# Patient Record
Sex: MALE | Race: WHITE | NOT HISPANIC OR LATINO | Employment: OTHER | URBAN - METROPOLITAN AREA
[De-identification: names, ages, dates, MRNs, and addresses within clinical notes are randomized per-mention and may not be internally consistent; named-entity substitution may affect disease eponyms.]

---

## 2019-06-15 ENCOUNTER — HOSPITAL ENCOUNTER (EMERGENCY)
Facility: CLINIC | Age: 74
Discharge: HOME | End: 2019-06-15
Attending: EMERGENCY MEDICINE | Admitting: EMERGENCY MEDICINE
Payer: COMMERCIAL

## 2019-06-15 VITALS
DIASTOLIC BLOOD PRESSURE: 67 MMHG | OXYGEN SATURATION: 98 % | TEMPERATURE: 97.9 F | WEIGHT: 188 LBS | RESPIRATION RATE: 12 BRPM | HEART RATE: 53 BPM | SYSTOLIC BLOOD PRESSURE: 123 MMHG | HEIGHT: 72 IN | BODY MASS INDEX: 25.47 KG/M2

## 2019-06-15 DIAGNOSIS — R55 NEAR SYNCOPE: ICD-10-CM

## 2019-06-15 PROBLEM — Z96.651 HISTORY OF TOTAL KNEE ARTHROPLASTY, RIGHT: Status: ACTIVE | Noted: 2019-05-06

## 2019-06-15 PROBLEM — M25.562 CHRONIC PAIN OF BOTH KNEES: Status: ACTIVE | Noted: 2017-07-20

## 2019-06-15 PROBLEM — G31.01 PRIMARY PROGRESSIVE APHASIA (H): Status: ACTIVE | Noted: 2019-02-26

## 2019-06-15 PROBLEM — G89.29 CHRONIC PAIN OF BOTH KNEES: Status: ACTIVE | Noted: 2017-07-20

## 2019-06-15 PROBLEM — M25.561 CHRONIC PAIN OF BOTH KNEES: Status: ACTIVE | Noted: 2017-07-20

## 2019-06-15 PROBLEM — I63.9 CEREBRAL INFARCT (H): Status: ACTIVE | Noted: 2018-07-30

## 2019-06-15 PROBLEM — F02.80 PRIMARY PROGRESSIVE APHASIA (H): Status: ACTIVE | Noted: 2019-02-26

## 2019-06-15 PROBLEM — M17.9 ARTHRITIS OF KNEE, DEGENERATIVE: Status: ACTIVE | Noted: 2019-03-04

## 2019-06-15 LAB
ALBUMIN UR-MCNC: 10 MG/DL
ANION GAP SERPL CALCULATED.3IONS-SCNC: 6 MMOL/L (ref 3–14)
APPEARANCE UR: CLEAR
BASOPHILS # BLD AUTO: 0.1 10E9/L (ref 0–0.2)
BASOPHILS NFR BLD AUTO: 1 %
BILIRUB UR QL STRIP: NEGATIVE
BUN SERPL-MCNC: 17 MG/DL (ref 7–30)
CALCIUM SERPL-MCNC: 8.5 MG/DL (ref 8.5–10.1)
CHLORIDE SERPL-SCNC: 110 MMOL/L (ref 94–109)
CO2 SERPL-SCNC: 26 MMOL/L (ref 20–32)
COLOR UR AUTO: YELLOW
CREAT SERPL-MCNC: 0.85 MG/DL (ref 0.66–1.25)
DIFFERENTIAL METHOD BLD: ABNORMAL
EOSINOPHIL # BLD AUTO: 0.4 10E9/L (ref 0–0.7)
EOSINOPHIL NFR BLD AUTO: 5.7 %
ERYTHROCYTE [DISTWIDTH] IN BLOOD BY AUTOMATED COUNT: 13.5 % (ref 10–15)
GFR SERPL CREATININE-BSD FRML MDRD: 86 ML/MIN/{1.73_M2}
GLUCOSE SERPL-MCNC: 104 MG/DL (ref 70–99)
GLUCOSE UR STRIP-MCNC: NEGATIVE MG/DL
HCT VFR BLD AUTO: 34.6 % (ref 40–53)
HGB BLD-MCNC: 11.6 G/DL (ref 13.3–17.7)
HGB UR QL STRIP: NEGATIVE
IMM GRANULOCYTES # BLD: 0 10E9/L (ref 0–0.4)
IMM GRANULOCYTES NFR BLD: 0.2 %
INTERPRETATION ECG - MUSE: NORMAL
KETONES UR STRIP-MCNC: 5 MG/DL
LEUKOCYTE ESTERASE UR QL STRIP: ABNORMAL
LYMPHOCYTES # BLD AUTO: 1.6 10E9/L (ref 0.8–5.3)
LYMPHOCYTES NFR BLD AUTO: 26.6 %
MCH RBC QN AUTO: 29.5 PG (ref 26.5–33)
MCHC RBC AUTO-ENTMCNC: 33.5 G/DL (ref 31.5–36.5)
MCV RBC AUTO: 88 FL (ref 78–100)
MONOCYTES # BLD AUTO: 0.5 10E9/L (ref 0–1.3)
MONOCYTES NFR BLD AUTO: 8.2 %
MUCOUS THREADS #/AREA URNS LPF: PRESENT /LPF
NEUTROPHILS # BLD AUTO: 3.6 10E9/L (ref 1.6–8.3)
NEUTROPHILS NFR BLD AUTO: 58.3 %
NITRATE UR QL: NEGATIVE
NRBC # BLD AUTO: 0 10*3/UL
NRBC BLD AUTO-RTO: 0 /100
PH UR STRIP: 5.5 PH (ref 5–7)
PLATELET # BLD AUTO: 197 10E9/L (ref 150–450)
POTASSIUM SERPL-SCNC: 3.5 MMOL/L (ref 3.4–5.3)
RBC # BLD AUTO: 3.93 10E12/L (ref 4.4–5.9)
RBC #/AREA URNS AUTO: <1 /HPF (ref 0–2)
SODIUM SERPL-SCNC: 142 MMOL/L (ref 133–144)
SOURCE: ABNORMAL
SP GR UR STRIP: 1.03 (ref 1–1.03)
TROPONIN I SERPL-MCNC: <0.015 UG/L (ref 0–0.04)
TSH SERPL DL<=0.005 MIU/L-ACNC: 1.24 MU/L (ref 0.4–4)
UROBILINOGEN UR STRIP-MCNC: 2 MG/DL (ref 0–2)
WBC # BLD AUTO: 6.1 10E9/L (ref 4–11)
WBC #/AREA URNS AUTO: 1 /HPF (ref 0–5)

## 2019-06-15 PROCEDURE — 81001 URINALYSIS AUTO W/SCOPE: CPT | Performed by: EMERGENCY MEDICINE

## 2019-06-15 PROCEDURE — 96361 HYDRATE IV INFUSION ADD-ON: CPT

## 2019-06-15 PROCEDURE — 25000128 H RX IP 250 OP 636: Performed by: EMERGENCY MEDICINE

## 2019-06-15 PROCEDURE — 84443 ASSAY THYROID STIM HORMONE: CPT | Performed by: EMERGENCY MEDICINE

## 2019-06-15 PROCEDURE — 99284 EMERGENCY DEPT VISIT MOD MDM: CPT | Mod: 25

## 2019-06-15 PROCEDURE — 80048 BASIC METABOLIC PNL TOTAL CA: CPT | Performed by: EMERGENCY MEDICINE

## 2019-06-15 PROCEDURE — 96360 HYDRATION IV INFUSION INIT: CPT

## 2019-06-15 PROCEDURE — 84484 ASSAY OF TROPONIN QUANT: CPT | Performed by: EMERGENCY MEDICINE

## 2019-06-15 PROCEDURE — 93005 ELECTROCARDIOGRAM TRACING: CPT

## 2019-06-15 PROCEDURE — 85025 COMPLETE CBC W/AUTO DIFF WBC: CPT | Performed by: EMERGENCY MEDICINE

## 2019-06-15 RX ORDER — ATORVASTATIN CALCIUM 10 MG/1
10 TABLET, FILM COATED ORAL
COMMUNITY
Start: 2019-04-16

## 2019-06-15 RX ORDER — DONEPEZIL HYDROCHLORIDE 5 MG/1
5 TABLET, FILM COATED ORAL
COMMUNITY

## 2019-06-15 RX ORDER — TAMSULOSIN HYDROCHLORIDE 0.4 MG/1
0.4 CAPSULE ORAL
COMMUNITY
Start: 2019-04-16

## 2019-06-15 RX ORDER — SODIUM CHLORIDE 9 MG/ML
1000 INJECTION, SOLUTION INTRAVENOUS CONTINUOUS
Status: DISCONTINUED | OUTPATIENT
Start: 2019-06-15 | End: 2019-06-15 | Stop reason: HOSPADM

## 2019-06-15 RX ORDER — ASPIRIN 81 MG/1
81 TABLET ORAL
COMMUNITY
Start: 2019-04-26

## 2019-06-15 RX ORDER — FLUTICASONE PROPIONATE 50 MCG
1 SPRAY, SUSPENSION (ML) NASAL
COMMUNITY

## 2019-06-15 RX ORDER — DIPHENOXYLATE HYDROCHLORIDE AND ATROPINE SULFATE 2.5; .025 MG/1; MG/1
1 TABLET ORAL
COMMUNITY

## 2019-06-15 RX ORDER — SODIUM PHOSPHATE,MONO-DIBASIC 19G-7G/118
2 ENEMA (ML) RECTAL
COMMUNITY

## 2019-06-15 RX ORDER — ASCORBIC ACID 500 MG
500 TABLET ORAL
COMMUNITY

## 2019-06-15 RX ADMIN — SODIUM CHLORIDE 1000 ML: 9 INJECTION, SOLUTION INTRAVENOUS at 11:45

## 2019-06-15 ASSESSMENT — ENCOUNTER SYMPTOMS
VOMITING: 0
DIAPHORESIS: 1
SHORTNESS OF BREATH: 0
DIARRHEA: 1
LIGHT-HEADEDNESS: 1
HEMATURIA: 0
PALPITATIONS: 0
NAUSEA: 0
HEADACHES: 0
COLOR CHANGE: 0
DYSURIA: 0
ABDOMINAL PAIN: 0

## 2019-06-15 ASSESSMENT — MIFFLIN-ST. JEOR: SCORE: 1630.76

## 2019-06-15 NOTE — ED AVS SNAPSHOT
Emergency Department  [address] [place] [address]  Phone:  [phone]  Fax:  [phone]                                    Burt Lopez   MRN: [id number]    Department:   Emergency Department   Date of Visit:  6/15/2019           After Visit Summary Signature Page    I have received my discharge instructions, and my questions have been answered. I have discussed any challenges I see with this plan with the nurse or doctor.    ..........................................................................................................................................  Patient/Patient Representative Signature      ..........................................................................................................................................  Patient Representative Print Name and Relationship to Patient    ..................................................               ................................................  Date                                   Time    ..........................................................................................................................................  Reviewed by Signature/Title    ...................................................              ..............................................  Date                                               Time          22EPIC Rev 08/18

## 2019-06-15 NOTE — ED PROVIDER NOTES
History     Chief Complaint:  Syncope (pt arrives via ambulance from [facility]. was shopping with his daughter when he started to get really sweaty and became unresponsive. Pt did not fall or lose complete consciousness, but per daughter his eyes rolled back in his head. Pt then came to and stated he needed to use the bathroom, was able to ambulate on his own and had a BM. VSS for EMS, . Pt has no complaints at this time just states he feels like he could take a nap. )    HPI   Burt Lopez is a 74 year old male with a history of stroke and hypertension who presents with near-syncope and lightheadedness. The patient's daughter reports that they were at [facility] and the patient was using the [employer] card machine when she noticed that he started to get shaky.  The pt remembers thiis and reports he was feeling lightheaded at that time.  He reports he has had a similar episode in the past. His daughter reported that he started to get unsteady, so she grabbed him and brought him over to a chair where he was diaphoretic and unresponsive. She notes that his eyes never closed, but the patient was unresponsive. The patient's daughter reports that eventually, he was responsive. Here in the ED, the patient reports that he remembered the event and felt light headed. He notes that he usually feels light headed after he takes imodium for his diarrhea. The patient reports that he had diarrhea yesterday, took two imodium, and had a soft bowel movement today. The patient denies recent illness exposure, change in urination, shortness of breath, chest pain, leg swelling, palpitations, abdominal pain, nausea, vomiting, headaches, rash or color change.    Allergies:  Penicillins    Medications:    Albuterol  Lipitor  Flomax    Past Medical History:    Asthma  Arthritis of knee  Benign prostatic hyperplasia  Cerebral infarct  Abdominal aortic aneurysm  Stroke    Past Surgical History:    Hemorrhoid surgery  Total knee arthroplasty  (R)    Family History:    Alcoholism  Pancreatic cancer metastatic to liver  Allergies  Asthma  Heart disease  Lung cancer  Throat cancer    Social History:  Smoking status: Former, 27 years since quitting  Alcohol use: No  Drug use: No  PCP: Adan Reyes  Presents to the ED with daughters  Marital Status:   [2]    Review of Systems   Constitutional: Positive for diaphoresis.   Respiratory: Negative for shortness of breath.    Cardiovascular: Negative for chest pain, palpitations and leg swelling.   Gastrointestinal: Positive for diarrhea. Negative for abdominal pain, nausea and vomiting.   Genitourinary: Negative for decreased urine volume, dysuria and hematuria.   Skin: Negative for color change and rash.   Neurological: Positive for syncope (near-syncope) and light-headedness. Negative for headaches.   All other systems reviewed and are negative.      Physical Exam     Patient Vitals for the past 24 hrs:   BP Temp Temp src Pulse Heart Rate Resp SpO2 Height Weight   06/15/19 1300 123/67 -- -- 53 54 12 -- -- --   06/15/19 1126 145/71 97.9  F (36.6  C) Oral -- 53 14 98 % 1.829 m (6') 85.3 kg (188 lb)     Physical Exam  Constitutional: Well developed, nontox appearance  Head: Atraumatic.   Mouth/Throat: Oropharynx is clear and moist.   Neck:  no stridor  Eyes: no scleral icterus, PERRL, EOMI  Cardiovascular: RRR, 2+ bilat radial pulses  Pulmonary/Chest: nml resp effort, Clear BS bilat  Abdominal: ND, +BS, soft, NT, no rebound or guarding   Ext: Warm, well perfused, no edema  Neurological: A&O,  CNII-XII intact, nml finger to nose, 5/5 strength throughout upper and lower ext, symmetric; sensation grossly intact  Skin: Skin is warm and dry.   Psychiatric: Behavior is normal. Thought content normal.   Nursing note and vitals reviewed.    Emergency Department Course   ECG (11:38:45):  Rate 61 bpm. LA interval 170. QRS duration 118. QT/QTc 474/477. P-R-T axes 68 31 55. Normal sinus rhythm. Nonspecific  intraventricular conduction delay. Borderline ECG. Interpreted at 1143 by Hector Lopez MD.    Laboratory:  CBC: HGB 11.6 (L) o/w WNL (WBC 6.1, )  BMP: Chloride 110 (H), Glucose 104 (H) o/w WNL (Creatinine 0.85)  Troponin I (1133): < 0.015  TSH with free T4 reflex: 1.24    Interventions:  1145: NS 1L IV Bolus    Emergency Department Course:  The patient arrived in the emergency department via EMS.  Past medical records, nursing notes, and vitals reviewed.  1128: I performed an exam of the patient and obtained history, as documented above.  IV inserted and blood drawn.  EKG performed, results above.     1209: I rechecked the patient.    1303: I rechecked the patient. Explained findings to the patient and his family. The patient ambulated to the bathroom.    1403: I rechecked the patient. Findings and plan explained to the Patient and daughters. Patient discharged home with instructions regarding supportive care, medications, and reasons to return. The importance of close follow-up was reviewed.     Impression & Plan    Medical Decision Makin year old male presenting w/ lightheadedness, near syncope     DDx includes orthostatic near syncope, vasovagal near syncope, electrolyte abnormality, dehydration, hypoglycemia.  Doubt ischemic CVA, intracranial hemorrhage given symptomatology, physical exam.  EKG interpretation as noted above significant for sinus bradycardia.  Previous heart rates noted on clinic notes range from the 60s to 80s.  Labs and imaging ordered as noted above.  Labs significant for minimal anemia but improved from last hemoglobin level in late 2019, UA inconsistent with infection, creatinine within normal limits, TSH within normal limits.  A long discussion with the patient's family regarding CT imaging.  I do not feel CT imaging is warranted at this time given the patient has no history of head trauma, is not anticoagulated nor do his symptoms seem consistent with  intracranial hemorrhage.  Offered CT bead despite likely not indicated.  After discussion, family is agreeable to not pursue head CT at this time.  Fluids given in the emergency department the patient reports feeling back to baseline.  Doubt significant arrhythmia given EKG, prodromal symptoms.  At this time I feel the pt is safe for discharge.  Recommendations given regarding follow up with PCP and return to the emergency department as needed for new or worsening symptoms.  Counseled on all results, disposition and diagnosis.  Patient and family understanding and agreeable to plan. Patient discharged in stable condition.     Diagnosis:    ICD-10-CM   1. Near syncope R55     Disposition:  discharged to home    Fermin Sheikh  6/15/2019    EMERGENCY DEPARTMENT  IFermin am serving as a scribe at 11:28 AM on 6/15/2019 to document services personally performed by Hector Lopez MD based on my observations and the provider's statements to me.      Hector Lopez MD  06/16/19 0014

## 2019-06-15 NOTE — DISCHARGE INSTRUCTIONS
Please eat when you get home.    Please drink plenty of fluids for the rest of the day and rest.    Please follow-up with your primary care doctor this coming week for recheck.    Please return to the emergency department as needed for new or worsening symptoms including fainting, severe confusion, focal weakness, chest pain, shortness of breath, vomiting and unable to keep anything down, any other concerning symptoms.

## 2019-06-15 NOTE — ED NOTES
Bed: ED27  Expected date:   Expected time:   Means of arrival:   Comments:  amita  - Lyle M syncope eta 1119

## 2019-11-08 ENCOUNTER — AMBULATORY - HEALTHEAST (OUTPATIENT)
Dept: GERIATRICS | Facility: CLINIC | Age: 74
End: 2019-11-08

## 2019-11-11 ENCOUNTER — AMBULATORY - HEALTHEAST (OUTPATIENT)
Dept: ADMINISTRATIVE | Facility: CLINIC | Age: 74
End: 2019-11-11

## 2019-11-11 ENCOUNTER — RECORDS - HEALTHEAST (OUTPATIENT)
Dept: LAB | Facility: CLINIC | Age: 74
End: 2019-11-11

## 2019-11-11 ENCOUNTER — OFFICE VISIT - HEALTHEAST (OUTPATIENT)
Dept: GERIATRICS | Facility: CLINIC | Age: 74
End: 2019-11-11

## 2019-11-11 DIAGNOSIS — M17.12 PRIMARY OSTEOARTHRITIS OF LEFT KNEE: ICD-10-CM

## 2019-11-11 DIAGNOSIS — Z71.89 ACP (ADVANCE CARE PLANNING): ICD-10-CM

## 2019-11-11 DIAGNOSIS — K59.03 DRUG-INDUCED CONSTIPATION: ICD-10-CM

## 2019-11-12 ENCOUNTER — OFFICE VISIT - HEALTHEAST (OUTPATIENT)
Dept: GERIATRICS | Facility: CLINIC | Age: 74
End: 2019-11-12

## 2019-11-12 DIAGNOSIS — R47.01 APHASIA: ICD-10-CM

## 2019-11-12 DIAGNOSIS — R52 PAIN MANAGEMENT: ICD-10-CM

## 2019-11-12 DIAGNOSIS — M17.12 PRIMARY OSTEOARTHRITIS OF LEFT KNEE: ICD-10-CM

## 2019-11-12 DIAGNOSIS — K59.03 DRUG-INDUCED CONSTIPATION: ICD-10-CM

## 2019-11-12 DIAGNOSIS — E78.2 MIXED HYPERLIPIDEMIA: ICD-10-CM

## 2019-11-12 DIAGNOSIS — R41.89 COGNITIVE IMPAIRMENT: ICD-10-CM

## 2019-11-12 LAB
ANION GAP SERPL CALCULATED.3IONS-SCNC: 11 MMOL/L (ref 5–18)
BUN SERPL-MCNC: 17 MG/DL (ref 8–28)
CALCIUM SERPL-MCNC: 9.4 MG/DL (ref 8.5–10.5)
CHLORIDE BLD-SCNC: 104 MMOL/L (ref 98–107)
CO2 SERPL-SCNC: 23 MMOL/L (ref 22–31)
CREAT SERPL-MCNC: 1 MG/DL (ref 0.7–1.3)
ERYTHROCYTE [DISTWIDTH] IN BLOOD BY AUTOMATED COUNT: 13.3 % (ref 11–14.5)
GFR SERPL CREATININE-BSD FRML MDRD: >60 ML/MIN/1.73M2
GLUCOSE BLD-MCNC: 110 MG/DL (ref 70–125)
HCT VFR BLD AUTO: 37.6 % (ref 40–54)
HGB BLD-MCNC: 12 G/DL (ref 14–18)
MAGNESIUM SERPL-MCNC: 2.3 MG/DL (ref 1.8–2.6)
MCH RBC QN AUTO: 29.2 PG (ref 27–34)
MCHC RBC AUTO-ENTMCNC: 31.9 G/DL (ref 32–36)
MCV RBC AUTO: 92 FL (ref 80–100)
PLATELET # BLD AUTO: 288 THOU/UL (ref 140–440)
PMV BLD AUTO: 9.6 FL (ref 8.5–12.5)
POTASSIUM BLD-SCNC: 4.2 MMOL/L (ref 3.5–5)
RBC # BLD AUTO: 4.11 MILL/UL (ref 4.4–6.2)
SODIUM SERPL-SCNC: 138 MMOL/L (ref 136–145)
WBC: 9.5 THOU/UL (ref 4–11)

## 2019-11-13 ENCOUNTER — OFFICE VISIT - HEALTHEAST (OUTPATIENT)
Dept: GERIATRICS | Facility: CLINIC | Age: 74
End: 2019-11-13

## 2019-11-13 DIAGNOSIS — K59.03 DRUG-INDUCED CONSTIPATION: ICD-10-CM

## 2019-11-13 DIAGNOSIS — M17.12 PRIMARY OSTEOARTHRITIS OF LEFT KNEE: ICD-10-CM

## 2019-11-14 ENCOUNTER — OFFICE VISIT - HEALTHEAST (OUTPATIENT)
Dept: GERIATRICS | Facility: CLINIC | Age: 74
End: 2019-11-14

## 2019-11-14 DIAGNOSIS — G31.01 PRIMARY PROGRESSIVE APHASIA (H): ICD-10-CM

## 2019-11-14 DIAGNOSIS — F02.80 PRIMARY PROGRESSIVE APHASIA (H): ICD-10-CM

## 2019-11-14 DIAGNOSIS — M17.12 PRIMARY OSTEOARTHRITIS OF LEFT KNEE: ICD-10-CM

## 2019-11-14 DIAGNOSIS — I63.529: ICD-10-CM

## 2019-11-18 ENCOUNTER — AMBULATORY - HEALTHEAST (OUTPATIENT)
Dept: GERIATRICS | Facility: CLINIC | Age: 74
End: 2019-11-18

## 2021-06-03 VITALS
DIASTOLIC BLOOD PRESSURE: 72 MMHG | BODY MASS INDEX: 27.75 KG/M2 | SYSTOLIC BLOOD PRESSURE: 135 MMHG | WEIGHT: 204.6 LBS | HEART RATE: 69 BPM | TEMPERATURE: 97.2 F

## 2021-06-03 VITALS
HEART RATE: 78 BPM | BODY MASS INDEX: 27.75 KG/M2 | DIASTOLIC BLOOD PRESSURE: 61 MMHG | TEMPERATURE: 97.4 F | SYSTOLIC BLOOD PRESSURE: 144 MMHG | WEIGHT: 204.6 LBS

## 2021-06-03 VITALS
BODY MASS INDEX: 27.75 KG/M2 | WEIGHT: 204.6 LBS | DIASTOLIC BLOOD PRESSURE: 67 MMHG | SYSTOLIC BLOOD PRESSURE: 168 MMHG | TEMPERATURE: 98.7 F | HEART RATE: 87 BPM

## 2021-06-03 NOTE — PROGRESS NOTES
[facility]      NAME:  Burt Lopez             :  1945    MRN: [id number]    CODE STATUS:  FULL CODE    FACILITY: [facility] [[id number]]  CHIEF COMPLAIN/REASON FOR VISIT:  Chief Complaint   Patient presents with     Review Of Multiple Medical Conditions       HISTORY OF PRESENT ILLNESS: Burt Lopez is a 74 y.o. male being seen today for review of multiple medical conditions. . He has a with a history of left knee osteoarthritis, abdominal aortic aneurysm, BPH, asthma and primary progressive aphasia, who underwent ARTHROPLASTY LEFT KNEE on 2019  His knee is dressed, , dressing C/D/I, to remain on until first ortho visit. Reports his pain is stable, after therapy today more pain is felt. Knee with faded bruising noted and swelling. He has been using ice reports effective with pain management. Reports progression in rehab and hopes to dec by end of week.       Allergies   Allergen Reactions     Penicillins Rash     Rash.     :     Current Outpatient Medications   Medication Sig     acetaminophen (TYLENOL) 500 MG tablet Take 1,000 mg by mouth 4 (four) times a day.     ascorbic acid, vitamin C, (VITAMIN C) 500 MG tablet Take 500 mg by mouth daily.     aspirin 81 MG EC tablet Take 81 mg by mouth 2 (two) times a day.     atorvastatin (LIPITOR) 10 MG tablet Take 10 mg by mouth every evening.     cholecalciferol, vitamin D3, 1,000 unit (25 mcg) tablet Take 2,000 Units by mouth daily.     donepezil (ARICEPT) 5 MG tablet Take 5 mg by mouth daily.     fluticasone propionate (FLONASE) 50 mcg/actuation nasal spray Apply 1 spray into each nostril daily as needed for rhinitis.     hydrOXYzine pamoate (VISTARIL) 25 MG capsule Take 25 mg by mouth at bedtime.     ketotifen (ZADITOR/ZYRTEC ITCHY EYES) 0.025 % (0.035 %) ophthalmic solution Administer 1 drop to both eyes daily as needed.     melatonin 5 mg Tab tablet Take 5 mg by mouth at bedtime.     multivitamin  (MULTIPLE VITAMIN ORAL) Take 1 tablet by mouth daily.     oxyCODONE (ROXICODONE) 5 MG immediate release tablet Take 5-10 mg by mouth every 4 (four) hours as needed for pain.     polyethylene glycol (MIRALAX) 17 gram packet Take 17 g by mouth 2 (two) times a day.     senna-docusate (SENNOSIDES-DOCUSATE SODIUM) 8.6-50 mg tablet Take 1 tablet by mouth daily.     tamsulosin (FLOMAX) 0.4 mg cap Take 0.4 mg by mouth daily after supper.         REVIEW OF SYSTEMS:    Currently, no fever, chills, or rigors. Does not have any visual or hearing problems. Denies any chest pain, headaches, palpitations, lightheadedness, dizziness, shortness of breath, or cough. Appetite is good. Denies any GERD symptoms. Denies any difficulty with swallowing, nausea, or vomiting.  Denies any abdominal pain,   Constipation imporved. Denies any urinary symptoms. No insomnia. No active bleeding. No rash.       PHYSICAL EXAMINATION:  Vitals:    11/14/19 0512   BP: 144/61   Pulse: 78   Temp: 97.4  F (36.3  C)   Weight: 204 lb 9.6 oz (92.8 kg)         GENERAL: Awake, Alert, oriented x3, not in any form of acute distress, answers questions appropriately, follows simple commands, conversant  HEENT: Head is normocephalic with normal hair distribution. No evidence of trauma. Ears: No acute purulent discharge. Eyes: Conjunctivae pink with no scleral jaundice. Nose: Normal mucosa and septum. NECK: Supple with no cervical or supraclavicular lymphadenopathy. Trachea is midline.   CHEST: No tenderness or deformity, no crepitus  LUNG: Clear to auscultation with good chest expansion. There are no crackles or wheezes, normal AP diameter.  BACK: No kyphosis of the thoracic spine. Symmetric, no curvature, ROM normal, no CVA tenderness, no spinal tenderness   CVS: There is good S1  S2, rhythm is regular.  ABDOMEN: Globular and soft, nontender to palpation, non distended, no masses, no organomegaly, good bowel sounds, no rebound or guarding, no peritoneal signs.    EXTREMITIES: Atraumatic. Limited  range of motion to left knee,  extremities, there is no tenderness to palpation, no pedal edema  SKIN: Warm and dry, no erythema noted, no rashes or lesions.  NEUROLOGICAL: The patient is oriented to person, place and time. Strength and sensation are grossly intact. Face is symmetric.                    LABS:    Lab Results   Component Value Date    WBC 9.5 11/12/2019    HGB 12.0 (L) 11/12/2019    HCT 37.6 (L) 11/12/2019    MCV 92 11/12/2019     11/12/2019       Results for orders placed or performed in visit on 11/12/19   Basic Metabolic Panel   Result Value Ref Range    Sodium 138 136 - 145 mmol/L    Potassium 4.2 3.5 - 5.0 mmol/L    Chloride 104 98 - 107 mmol/L    CO2 23 22 - 31 mmol/L    Anion Gap, Calculation 11 5 - 18 mmol/L    Glucose 110 70 - 125 mg/dL    Calcium 9.4 8.5 - 10.5 mg/dL    BUN 17 8 - 28 mg/dL    Creatinine 1.00 0.70 - 1.30 mg/dL    GFR MDRD Af Amer >60 >60 mL/min/1.73m2    GFR MDRD Non Af Amer >60 >60 mL/min/1.73m2           No results found for: HGBA1C  No results found for: EQWDCYGT09CO  No results found for: HHGHOPJS08    ASSESSMENT/PLAN:  1. Primary osteoarthritis of left knee    2. Drug-induced constipation      1.LTKA: Leave current dressing in place, SN to assist with pain management and chronic medical conditions. To attend therapies for rehab for knee. Reports pain present, ice has been an effective adjunct to oral meds. Ongoing therapies with potential dc this week end.    2.Costipaton, Started on on Miralax daily, add senna s 1 po daily for bowel program , reports BM more regular now.        Electronically signed by:  Apolonia Rodriguez CNP  This progress note was completed using Dragon software and there may be grammatical errors.

## 2021-06-03 NOTE — PROGRESS NOTES
[facility] Admission note      Patient: Burt Lopez  MRN: [id number]  Date of Service: 11/12/2019      [facility] [[id number]]  Reason for Visit     Chief Complaint   Patient presents with     H & P       Code Status     FULL CODE    Assessment     -Status post left TKA done as an elective procedure on 11/5/2019  -Postoperative pain management  - ABLA  -  DVT prophylaxis  -History of idiopathic expressive aphasia  -History of CVA  -History of suspected dementia  - HLD ON statin    Plan     Patient is admitted to the TCU.  Incision was examined and healing well with an intact Mepilex dressing  His range of movement is quite limited to about 30 degrees and he was educated not to take the Mepilex dressing off  He will follow-up with his surgeon  Continue with his PT OT and rehab he is currently emulating well using a walker.  Discharge planning was reviewed with him and he is hoping to discharge home this week he does live alone and told me as soon as he can get off oxycodone and he can go home he can resume driving also.  Family history some concerns about cognition  Staff is also noted some concerns evaluate a formal cognitive testing he does have expressive aphasia but was able to communicate his needs reasonably well to me.  Check labs reviewed and hemoglobin is now 12 which is a significant improvement  Electrolytes and kidney functions are also stable  Senna as added to his bowel regimen for constipation concerns  Continue with his PT OT and rehab    History     Patient is a very pleasant 74 y.o. male who is admitted to TCU  Patient was electively admitted and underwent a left total knee arthroplasty this was done as an elective procedure on 11/5/2019 he tolerated well.  Currently is been discharged weightbearing as tolerated  He has a Mepilex dressing which is intact over his left knee he believes that his range of movement is quite limited due to the dressing and was educated  that his surgeon has requested that dressing not be taken off.  He has underlying history of dementia.  He has appeared to be alert and oriented but as per his daughter and staff there is some episodes of confusion.  There is also significant expressive aphasia noted which is idiopathic as per him he is able to communicate reasonably well and wants to discharge home soon.  Pain management reviewed he is on Tylenol Vistaril as well as oxycodone which has been effective he is also been icing his knee.  Postoperatively he had some drop in hemoglobin his discharge hemoglobin was stable at 10.6 with no need for blood transfusion  Recheck hemoglobin done in the TCU was 12 showing significant improvement      Past Medical History     Active Ambulatory (Non-Hospital) Problems    Diagnosis     Constipation     ACP (advance care planning)     Arthritis of knee, degenerative     Past Medical History:   Diagnosis Date     AAA (abdominal aortic aneurysm) (H)      Arthritis of knee, degenerative      Asthma      BPH (benign prostatic hyperplasia)      HLD (hyperlipidemia)      Primary localized osteoarthritis of left knee      Primary progressive aphasia (H)      Seasonal allergic rhinitis due to pollen        Past Social History     Reviewed, and he  reports that he quit smoking about 30 years ago. He smoked 0.00 packs per day for 27.00 years. He has never used smokeless tobacco. He reports previous alcohol use.    Family History     Reviewed, and family history includes Alcoholism in his father; Heart attack in his father; Lung cancer in his father; Pancreatic cancer in his brother; Throat cancer in his mother.    Medication List   Post Discharge Medication Reconciliation Status: discharge medications reconciled and changed, per note/orders (see AVS)   Current Outpatient Medications on File Prior to Visit   Medication Sig Dispense Refill     acetaminophen (TYLENOL) 500 MG tablet Take 1,000 mg by mouth 4 (four) times a day.        ascorbic acid, vitamin C, (VITAMIN C) 500 MG tablet Take 500 mg by mouth daily.       aspirin 81 MG EC tablet Take 81 mg by mouth 2 (two) times a day.       atorvastatin (LIPITOR) 10 MG tablet Take 10 mg by mouth every evening.       cholecalciferol, vitamin D3, 1,000 unit (25 mcg) tablet Take 2,000 Units by mouth daily.       donepezil (ARICEPT) 5 MG tablet Take 5 mg by mouth daily.       fluticasone propionate (FLONASE) 50 mcg/actuation nasal spray Apply 1 spray into each nostril daily as needed for rhinitis.       hydrOXYzine pamoate (VISTARIL) 25 MG capsule Take 25 mg by mouth at bedtime.       ketotifen (ZADITOR/ZYRTEC ITCHY EYES) 0.025 % (0.035 %) ophthalmic solution Administer 1 drop to both eyes daily as needed.       melatonin 5 mg Tab tablet Take 5 mg by mouth at bedtime.       multivitamin (MULTIPLE VITAMIN ORAL) Take 1 tablet by mouth daily.       oxyCODONE (ROXICODONE) 5 MG immediate release tablet Take 5-10 mg by mouth every 4 (four) hours as needed for pain.       polyethylene glycol (MIRALAX) 17 gram packet Take 17 g by mouth 2 (two) times a day.       senna-docusate (SENNOSIDES-DOCUSATE SODIUM) 8.6-50 mg tablet Take 1 tablet by mouth daily.       tamsulosin (FLOMAX) 0.4 mg cap Take 0.4 mg by mouth daily after supper.       Current Facility-Administered Medications on File Prior to Visit   Medication Dose Route Frequency Provider Last Rate Last Dose     [DISCONTINUED] acetaminophen tablet (TYLENOL)  1,000 mg Oral  GENERIC EXTERNAL DATA PROVIDER         [DISCONTINUED] aspirin EC tablet  81 mg Oral  GENERIC EXTERNAL DATA PROVIDER         [DISCONTINUED] atorvastatin tablet (LIPITOR)  10 mg Oral  GENERIC EXTERNAL DATA PROVIDER         [DISCONTINUED] calcium (as carbonate) chewable tablet (TUMS)  500-1,000 mg Oral  GENERIC EXTERNAL DATA PROVIDER         [DISCONTINUED] diazePAM tablet (VALIUM)  2 mg Oral  GENERIC EXTERNAL DATA PROVIDER         [DISCONTINUED] donepezil tablet (ARICEPT)  5 mg Oral   GENERIC EXTERNAL DATA PROVIDER         [DISCONTINUED] GENERIC EXTERNAL MEDICATION  4 mg Sublingual  GENERIC EXTERNAL DATA PROVIDER         [DISCONTINUED] HYDROmorphone injection (DILAUDID)  0.2-0.4 mg Intravenous  GENERIC EXTERNAL DATA PROVIDER         [DISCONTINUED] melatonin tablet  5 mg Oral  GENERIC EXTERNAL DATA PROVIDER         [DISCONTINUED] naloxone injection (NARCAN)  0.4 mg Intravenous  GENERIC EXTERNAL DATA PROVIDER         [DISCONTINUED] oxyCODONE immediate release tablet (ROXICODONE)  5-10 mg Oral  GENERIC EXTERNAL DATA PROVIDER         [DISCONTINUED] oxyCODONE immediate release tablet (ROXICODONE)  5 mg Oral  GENERIC EXTERNAL DATA PROVIDER         [DISCONTINUED] polyethylene glycol packet (MIRALAX)  1 packet Oral  GENERIC EXTERNAL DATA PROVIDER         [DISCONTINUED] senna-docusate 8.6-50 mg tablet (PERICOLACE)  1-3 tablet Oral  GENERIC EXTERNAL DATA PROVIDER         [DISCONTINUED] sodium chloride 0.9 % with KCl 20 mEq/L infusion  125 mL/hr Intravenous  GENERIC EXTERNAL DATA PROVIDER         [DISCONTINUED] sodium chloride flush (NS)  5 mL Intravenous  GENERIC EXTERNAL DATA PROVIDER         [DISCONTINUED] sodium chloride flush (NS)  5 mL Intravenous  GENERIC EXTERNAL DATA PROVIDER         [DISCONTINUED] sodium chloride flush (NS)  10 mL Intravenous  GENERIC EXTERNAL DATA PROVIDER         [DISCONTINUED] tamsulosin 24 hr capsule (FLOMAX)  0.4 mg Oral  GENERIC EXTERNAL DATA PROVIDER           Allergies     Allergies   Allergen Reactions     Penicillins Rash     Rash.         Review of Systems   A comprehensive review of 14 systems was done. Pertinent findings noted here and in history of present illness. All the rest negative.  Constitutional: Negative.  Negative for fever, chills, he has activity change, appetite change and fatigue.   HENT: Negative for congestion and facial swelling.    Eyes: Negative for photophobia, redness and visual disturbance.   Respiratory: Negative for cough and chest tightness.     Cardiovascular: Negative for chest pain, palpitations and leg swelling.   Gastrointestinal: Negative for nausea, diarrhea, constipation, blood in stool and abdominal distention.   Genitourinary: Negative.    Musculoskeletal: Negative.  Reporting pain with swelling in the left knee which is limiting his ability to bend the knee  Skin: Negative.    Neurological: Negative for dizziness, tremors, syncope, weakness, light-headedness and headaches.   Has chronic expressive aphasia but can communicate  Hematological: Does not bruise/bleed easily.   Psychiatric/Behavioral: Negative.        Physical Exam     Recent Vitals 11/12/2019   Weight 204 lbs 10 oz   /72   Pulse 69   Temp 97.2       Constitutional: Oriented to person, place, and time and appears well-developed.   HEENT:  Normocephalic and atraumatic.  Eyes: Conjunctivae and EOM are normal. Pupils are equal, round, and reactive to light. No discharge.  No scleral icterus. Nose normal. Mouth/Throat: Oropharynx is clear and moist. No oropharyngeal exudate.    NECK: Normal range of motion. Neck supple. No JVD present. No tracheal deviation present. No thyromegaly present.   CARDIOVASCULAR: Normal rate, regular rhythm and intact distal pulses.  Exam reveals no gallop and no friction rub.  Systolic murmur present.  PULMONARY: Effort normal and breath sounds normal. No respiratory distress.No Wheezing or rales.  ABDOMEN: Soft. Bowel sounds are normal. No distension and no mass.  There is no tenderness. There is no rebound and no guarding. No HSM.  MUSCULOSKELETAL: Normal range of motion. No edema and no tenderness. Mild kyphosis, no tenderness.  Left knee joint with an intact surgical incision covered with a Mepilex dressing  Some effusion noted around the knee joint  LYMPH NODES: Has no cervical, supraclavicular, axillary and groin adenopathy.   NEUROLOGICAL: Alert and oriented to person, place, and time. No cranial nerve deficit.  Normal muscle tone. Coordination  normal.   Patient has expressive aphasia and had some word finding difficulty but able to communicate well  GENITOURINARY: Deferred exam.  SKIN: Skin is warm and dry. No rash noted. No erythema. No pallor.   EXTREMITIES: No cyanosis, no clubbing, no edema. No Deformity.  PSYCHIATRIC: Normal mood, affect and behavior.  Family and staff noticing some confusion      Lab Results     Recent Results (from the past 240 hour(s))   HM2(CBC w/o Differential)   Result Value Ref Range    WBC 9.5 4.0 - 11.0 thou/uL    RBC 4.11 (L) 4.40 - 6.20 mill/uL    Hemoglobin 12.0 (L) 14.0 - 18.0 g/dL    Hematocrit 37.6 (L) 40.0 - 54.0 %    MCV 92 80 - 100 fL    MCH 29.2 27.0 - 34.0 pg    MCHC 31.9 (L) 32.0 - 36.0 g/dL    RDW 13.3 11.0 - 14.5 %    Platelets 288 140 - 440 thou/uL    MPV 9.6 8.5 - 12.5 fL   Basic Metabolic Panel   Result Value Ref Range    Sodium 138 136 - 145 mmol/L    Potassium 4.2 3.5 - 5.0 mmol/L    Chloride 104 98 - 107 mmol/L    CO2 23 22 - 31 mmol/L    Anion Gap, Calculation 11 5 - 18 mmol/L    Glucose 110 70 - 125 mg/dL    Calcium 9.4 8.5 - 10.5 mg/dL    BUN 17 8 - 28 mg/dL    Creatinine 1.00 0.70 - 1.30 mg/dL    GFR MDRD Af Amer >60 >60 mL/min/1.73m2    GFR MDRD Non Af Amer >60 >60 mL/min/1.73m2   Magnesium   Result Value Ref Range    Magnesium 2.3 1.8 - 2.6 mg/dL           JEANNE June

## 2021-06-03 NOTE — PROGRESS NOTES
[facility]      NAME:  Burt Lopez             :  1945    MRN: [id number]    CODE STATUS:  FULL CODE    FACILITY: [facility] (formerly [facility]) [[id number]]  CHIEF COMPLAIN/REASON FOR VISIT:  Chief Complaint   Patient presents with     Review Of Multiple Medical Conditions       HISTORY OF PRESENT ILLNESS: Burt Lopez is a 74 y.o. male being seen today as a new admit from [facility]. He has a with a history of left knee osteoarthritis, abdominal aortic aneurysm, BPH, asthma and primary progressive aphasia, who underwent ARTHROPLASTY LEFT KNEE on 2019  His knee is dressed, , dressing C/D/I, o remain on until first ortho vidit. Reports his pain is stable. Knee with faded bruising noted and swelling. He has been using ice reports effective with pain management. Relatively benign assessment other then no BM x5 days.      Allergies   Allergen Reactions     Penicillins Rash     Rash.     :     Current Outpatient Medications   Medication Sig     acetaminophen (TYLENOL) 500 MG tablet Take 1,000 mg by mouth 4 (four) times a day.     ascorbic acid, vitamin C, (VITAMIN C) 500 MG tablet Take 500 mg by mouth daily.     aspirin 81 MG EC tablet Take 81 mg by mouth 2 (two) times a day.     atorvastatin (LIPITOR) 10 MG tablet Take 10 mg by mouth every evening.     cholecalciferol, vitamin D3, 1,000 unit (25 mcg) tablet Take 2,000 Units by mouth daily.     donepezil (ARICEPT) 5 MG tablet Take 5 mg by mouth daily.     fluticasone propionate (FLONASE) 50 mcg/actuation nasal spray Apply 1 spray into each nostril daily as needed for rhinitis.     hydrOXYzine pamoate (VISTARIL) 25 MG capsule Take 25 mg by mouth at bedtime.     ketotifen (ZADITOR/ZYRTEC ITCHY EYES) 0.025 % (0.035 %) ophthalmic solution Administer 1 drop to both eyes daily as needed.     melatonin 5 mg Tab tablet Take 5 mg by mouth at bedtime.     multivitamin (MULTIPLE VITAMIN ORAL) Take 1 tablet by mouth daily.      oxyCODONE (ROXICODONE) 5 MG immediate release tablet Take 5-10 mg by mouth every 4 (four) hours as needed for pain.     polyethylene glycol (MIRALAX) 17 gram packet Take 17 g by mouth 2 (two) times a day.     senna-docusate (SENNOSIDES-DOCUSATE SODIUM) 8.6-50 mg tablet Take 1 tablet by mouth daily.     tamsulosin (FLOMAX) 0.4 mg cap Take 0.4 mg by mouth daily after supper.         REVIEW OF SYSTEMS:    Currently, no fever, chills, or rigors. Does not have any visual or hearing problems. Denies any chest pain, headaches, palpitations, lightheadedness, dizziness, shortness of breath, or cough. Appetite is good. Denies any GERD symptoms. Denies any difficulty with swallowing, nausea, or vomiting.  Denies any abdominal pain, has  constipation. Denies any urinary symptoms. No insomnia. No active bleeding. No rash.       PHYSICAL EXAMINATION:  Vitals:    11/12/19 0554   BP: 135/72   Pulse: 69   Temp: 97.2  F (36.2  C)   Weight: 204 lb 9.6 oz (92.8 kg)         GENERAL: Awake, Alert, oriented x3, not in any form of acute distress, answers questions appropriately, follows simple commands, conversant  HEENT: Head is normocephalic with normal hair distribution. No evidence of trauma. Ears: No acute purulent discharge. Eyes: Conjunctivae pink with no scleral jaundice. Nose: Normal mucosa and septum. NECK: Supple with no cervical or supraclavicular lymphadenopathy. Trachea is midline.   CHEST: No tenderness or deformity, no crepitus  LUNG: Clear to auscultation with good chest expansion. There are no crackles or wheezes, normal AP diameter.  BACK: No kyphosis of the thoracic spine. Symmetric, no curvature, ROM normal, no CVA tenderness, no spinal tenderness   CVS: There is good S1  S2, rhythm is regular.  ABDOMEN: Globular and soft, nontender to palpation, non distended, no masses, no organomegaly, good bowel sounds, no rebound or guarding, no peritoneal signs.   EXTREMITIES: Atraumatic. Limited  range of motion to left knee,   extremities, there is no tenderness to palpation, no pedal edema  SKIN: Warm and dry, no erythema noted, no rashes or lesions.  NEUROLOGICAL: The patient is oriented to person, place and time. Strength and sensation are grossly intact. Face is symmetric.                    LABS:    No results found for: WBC, HGB, HCT, MCV, PLT    No results found for this or any previous visit.        No results found for: HGBA1C  No results found for: [id number]  No results found for: [id number]    ASSESSMENT/PLAN:  1. Primary osteoarthritis of left knee    2. Drug-induced constipation    3. ACP (advance care planning)      1.LTKA: Leave current dressing in place, SN to assist with pain management and chronic medical conditions. To attend therapies for rehab for knee.    2.Costipaton, no BM x 5 days, give MOM 30 cc po today, he is on Miralax daily, add senna s 1 po daily for bowel program     3. ACP: Pt full code status, greater then 16 minutes spent face to face with pt discussing his advance care planning.       Electronically signed by:  Apolonia Rodriguez CNP  This progress note was completed using Dragon software and there may be grammatical errors.

## 2021-06-19 NOTE — LETTER
Letter by Yadira Stacy MBBS at      Author: Yadira Stacy MBBS Service: -- Author Type: --    Filed:  Encounter Date: 11/12/2019 Status: Signed         Patient: Burt Lopez   MR Number: [id number]   YOB: 1945   Date of Visit: 11/12/2019       [facility] Admission note      Patient: Burt Lopez  MRN: [id number]  Date of Service: 11/12/2019      [facility] [[id number]]  Reason for Visit     Chief Complaint   Patient presents with   ? H & P       Code Status     FULL CODE    Assessment     -Status post left TKA done as an elective procedure on 11/5/2019  -Postoperative pain management  - ABLA  -  DVT prophylaxis  -History of idiopathic expressive aphasia  -History of CVA  -History of suspected dementia  - HLD ON statin    Plan     Patient is admitted to the TCU.  Incision was examined and healing well with an intact Mepilex dressing  His range of movement is quite limited to about 30 degrees and he was educated not to take the Mepilex dressing off  He will follow-up with his surgeon  Continue with his PT OT and rehab he is currently emulating well using a walker.  Discharge planning was reviewed with him and he is hoping to discharge home this week he does live alone and told me as soon as he can get off oxycodone and he can go home he can resume driving also.  Family history some concerns about cognition  Staff is also noted some concerns evaluate a formal cognitive testing he does have expressive aphasia but was able to communicate his needs reasonably well to me.  Check labs reviewed and hemoglobin is now 12 which is a significant improvement  Electrolytes and kidney functions are also stable  Senna as added to his bowel regimen for constipation concerns  Continue with his PT OT and rehab    History     Patient is a very pleasant 74 y.o. male who is admitted to TCU  Patient was electively admitted and underwent a left total knee arthroplasty this was done as  an elective procedure on 11/5/2019 he tolerated well.  Currently is been discharged weightbearing as tolerated  He has a Mepilex dressing which is intact over his left knee he believes that his range of movement is quite limited due to the dressing and was educated that his surgeon has requested that dressing not be taken off.  He has underlying history of dementia.  He has appeared to be alert and oriented but as per his daughter and staff there is some episodes of confusion.  There is also significant expressive aphasia noted which is idiopathic as per him he is able to communicate reasonably well and wants to discharge home soon.  Pain management reviewed he is on Tylenol Vistaril as well as oxycodone which has been effective he is also been icing his knee.  Postoperatively he had some drop in hemoglobin his discharge hemoglobin was stable at 10.6 with no need for blood transfusion  Recheck hemoglobin done in the TCU was 12 showing significant improvement      Past Medical History     Active Ambulatory (Non-Hospital) Problems    Diagnosis   ? Constipation   ? ACP (advance care planning)   ? Arthritis of knee, degenerative     Past Medical History:   Diagnosis Date   ? AAA (abdominal aortic aneurysm) (H)    ? Arthritis of knee, degenerative    ? Asthma    ? BPH (benign prostatic hyperplasia)    ? HLD (hyperlipidemia)    ? Primary localized osteoarthritis of left knee    ? Primary progressive aphasia (H)    ? Seasonal allergic rhinitis due to pollen        Past Social History     Reviewed, and he  reports that he quit smoking about 30 years ago. He smoked 0.00 packs per day for 27.00 years. He has never used smokeless tobacco. He reports previous alcohol use.    Family History     Reviewed, and family history includes Alcoholism in his father; Heart attack in his father; Lung cancer in his father; Pancreatic cancer in his brother; Throat cancer in his mother.    Medication List   Post Discharge Medication  Reconciliation Status: discharge medications reconciled and changed, per note/orders (see AVS)   Current Outpatient Medications on File Prior to Visit   Medication Sig Dispense Refill   ? acetaminophen (TYLENOL) 500 MG tablet Take 1,000 mg by mouth 4 (four) times a day.     ? ascorbic acid, vitamin C, (VITAMIN C) 500 MG tablet Take 500 mg by mouth daily.     ? aspirin 81 MG EC tablet Take 81 mg by mouth 2 (two) times a day.     ? atorvastatin (LIPITOR) 10 MG tablet Take 10 mg by mouth every evening.     ? cholecalciferol, vitamin D3, 1,000 unit (25 mcg) tablet Take 2,000 Units by mouth daily.     ? donepezil (ARICEPT) 5 MG tablet Take 5 mg by mouth daily.     ? fluticasone propionate (FLONASE) 50 mcg/actuation nasal spray Apply 1 spray into each nostril daily as needed for rhinitis.     ? hydrOXYzine pamoate (VISTARIL) 25 MG capsule Take 25 mg by mouth at bedtime.     ? ketotifen (ZADITOR/ZYRTEC ITCHY EYES) 0.025 % (0.035 %) ophthalmic solution Administer 1 drop to both eyes daily as needed.     ? melatonin 5 mg Tab tablet Take 5 mg by mouth at bedtime.     ? multivitamin (MULTIPLE VITAMIN ORAL) Take 1 tablet by mouth daily.     ? oxyCODONE (ROXICODONE) 5 MG immediate release tablet Take 5-10 mg by mouth every 4 (four) hours as needed for pain.     ? polyethylene glycol (MIRALAX) 17 gram packet Take 17 g by mouth 2 (two) times a day.     ? senna-docusate (SENNOSIDES-DOCUSATE SODIUM) 8.6-50 mg tablet Take 1 tablet by mouth daily.     ? tamsulosin (FLOMAX) 0.4 mg cap Take 0.4 mg by mouth daily after supper.       Current Facility-Administered Medications on File Prior to Visit   Medication Dose Route Frequency Provider Last Rate Last Dose   ? [DISCONTINUED] acetaminophen tablet (TYLENOL)  1,000 mg Oral  GENERIC EXTERNAL DATA PROVIDER       ? [DISCONTINUED] aspirin EC tablet  81 mg Oral  GENERIC EXTERNAL DATA PROVIDER       ? [DISCONTINUED] atorvastatin tablet (LIPITOR)  10 mg Oral  GENERIC EXTERNAL DATA PROVIDER        ? [DISCONTINUED] calcium (as carbonate) chewable tablet (TUMS)  500-1,000 mg Oral  GENERIC EXTERNAL DATA PROVIDER       ? [DISCONTINUED] diazePAM tablet (VALIUM)  2 mg Oral  GENERIC EXTERNAL DATA PROVIDER       ? [DISCONTINUED] donepezil tablet (ARICEPT)  5 mg Oral  GENERIC EXTERNAL DATA PROVIDER       ? [DISCONTINUED] GENERIC EXTERNAL MEDICATION  4 mg Sublingual  GENERIC EXTERNAL DATA PROVIDER       ? [DISCONTINUED] HYDROmorphone injection (DILAUDID)  0.2-0.4 mg Intravenous  GENERIC EXTERNAL DATA PROVIDER       ? [DISCONTINUED] melatonin tablet  5 mg Oral  GENERIC EXTERNAL DATA PROVIDER       ? [DISCONTINUED] naloxone injection (NARCAN)  0.4 mg Intravenous  GENERIC EXTERNAL DATA PROVIDER       ? [DISCONTINUED] oxyCODONE immediate release tablet (ROXICODONE)  5-10 mg Oral  GENERIC EXTERNAL DATA PROVIDER       ? [DISCONTINUED] oxyCODONE immediate release tablet (ROXICODONE)  5 mg Oral  GENERIC EXTERNAL DATA PROVIDER       ? [DISCONTINUED] polyethylene glycol packet (MIRALAX)  1 packet Oral  GENERIC EXTERNAL DATA PROVIDER       ? [DISCONTINUED] senna-docusate 8.6-50 mg tablet (PERICOLACE)  1-3 tablet Oral  GENERIC EXTERNAL DATA PROVIDER       ? [DISCONTINUED] sodium chloride 0.9 % with KCl 20 mEq/L infusion  125 mL/hr Intravenous  GENERIC EXTERNAL DATA PROVIDER       ? [DISCONTINUED] sodium chloride flush (NS)  5 mL Intravenous  GENERIC EXTERNAL DATA PROVIDER       ? [DISCONTINUED] sodium chloride flush (NS)  5 mL Intravenous  GENERIC EXTERNAL DATA PROVIDER       ? [DISCONTINUED] sodium chloride flush (NS)  10 mL Intravenous  GENERIC EXTERNAL DATA PROVIDER       ? [DISCONTINUED] tamsulosin 24 hr capsule (FLOMAX)  0.4 mg Oral  GENERIC EXTERNAL DATA PROVIDER           Allergies     Allergies   Allergen Reactions   ? Penicillins Rash     Rash.         Review of Systems   A comprehensive review of 14 systems was done. Pertinent findings noted here and in history of present illness. All the rest  negative.  Constitutional: Negative.  Negative for fever, chills, he has activity change, appetite change and fatigue.   HENT: Negative for congestion and facial swelling.    Eyes: Negative for photophobia, redness and visual disturbance.   Respiratory: Negative for cough and chest tightness.    Cardiovascular: Negative for chest pain, palpitations and leg swelling.   Gastrointestinal: Negative for nausea, diarrhea, constipation, blood in stool and abdominal distention.   Genitourinary: Negative.    Musculoskeletal: Negative.  Reporting pain with swelling in the left knee which is limiting his ability to bend the knee  Skin: Negative.    Neurological: Negative for dizziness, tremors, syncope, weakness, light-headedness and headaches.   Has chronic expressive aphasia but can communicate  Hematological: Does not bruise/bleed easily.   Psychiatric/Behavioral: Negative.        Physical Exam     Recent Vitals 11/12/2019   Weight 204 lbs 10 oz   /72   Pulse 69   Temp 97.2       Constitutional: Oriented to person, place, and time and appears well-developed.   HEENT:  Normocephalic and atraumatic.  Eyes: Conjunctivae and EOM are normal. Pupils are equal, round, and reactive to light. No discharge.  No scleral icterus. Nose normal. Mouth/Throat: Oropharynx is clear and moist. No oropharyngeal exudate.    NECK: Normal range of motion. Neck supple. No JVD present. No tracheal deviation present. No thyromegaly present.   CARDIOVASCULAR: Normal rate, regular rhythm and intact distal pulses.  Exam reveals no gallop and no friction rub.  Systolic murmur present.  PULMONARY: Effort normal and breath sounds normal. No respiratory distress.No Wheezing or rales.  ABDOMEN: Soft. Bowel sounds are normal. No distension and no mass.  There is no tenderness. There is no rebound and no guarding. No HSM.  MUSCULOSKELETAL: Normal range of motion. No edema and no tenderness. Mild kyphosis, no tenderness.  Left knee joint with an intact  surgical incision covered with a Mepilex dressing  Some effusion noted around the knee joint  LYMPH NODES: Has no cervical, supraclavicular, axillary and groin adenopathy.   NEUROLOGICAL: Alert and oriented to person, place, and time. No cranial nerve deficit.  Normal muscle tone. Coordination normal.   Patient has expressive aphasia and had some word finding difficulty but able to communicate well  GENITOURINARY: Deferred exam.  SKIN: Skin is warm and dry. No rash noted. No erythema. No pallor.   EXTREMITIES: No cyanosis, no clubbing, no edema. No Deformity.  PSYCHIATRIC: Normal mood, affect and behavior.  Family and staff noticing some confusion      Lab Results     Recent Results (from the past 240 hour(s))   HM2(CBC w/o Differential)   Result Value Ref Range    WBC 9.5 4.0 - 11.0 thou/uL    RBC 4.11 (L) 4.40 - 6.20 mill/uL    Hemoglobin 12.0 (L) 14.0 - 18.0 g/dL    Hematocrit 37.6 (L) 40.0 - 54.0 %    MCV 92 80 - 100 fL    MCH 29.2 27.0 - 34.0 pg    MCHC 31.9 (L) 32.0 - 36.0 g/dL    RDW 13.3 11.0 - 14.5 %    Platelets 288 140 - 440 thou/uL    MPV 9.6 8.5 - 12.5 fL   Basic Metabolic Panel   Result Value Ref Range    Sodium 138 136 - 145 mmol/L    Potassium 4.2 3.5 - 5.0 mmol/L    Chloride 104 98 - 107 mmol/L    CO2 23 22 - 31 mmol/L    Anion Gap, Calculation 11 5 - 18 mmol/L    Glucose 110 70 - 125 mg/dL    Calcium 9.4 8.5 - 10.5 mg/dL    BUN 17 8 - 28 mg/dL    Creatinine 1.00 0.70 - 1.30 mg/dL    GFR MDRD Af Amer >60 >60 mL/min/1.73m2    GFR MDRD Non Af Amer >60 >60 mL/min/1.73m2   Magnesium   Result Value Ref Range    Magnesium 2.3 1.8 - 2.6 mg/dL           JEANNE June

## 2021-06-19 NOTE — LETTER
Letter by Apolonia Rodriguez CNP at      Author: Apolonia Rodriguez CNP Service: -- Author Type: --    Filed:  Encounter Date: 2019 Status: Signed         Patient: Burt Lopez   MR Number: [id number]   YOB: 1945   Date of Visit: 2019       [facility]      NAME:  Burt Lopez             :  1945    MRN: [id number]    CODE STATUS:  FULL CODE    FACILITY: [facility] [[id number]]  CHIEF COMPLAIN/REASON FOR VISIT:  Chief Complaint   Patient presents with   ? Review Of Multiple Medical Conditions       HISTORY OF PRESENT ILLNESS: Burt Lopez is a 74 y.o. male being seen today for review of multiple medical conditions. . He has a with a history of left knee osteoarthritis, abdominal aortic aneurysm, BPH, asthma and primary progressive aphasia, who underwent ARTHROPLASTY LEFT KNEE on 2019  His knee is dressed, , dressing C/D/I, to remain on until first ortho visit. Reports his pain is stable, after therapy today more pain is felt. Knee with faded bruising noted and swelling. He has been using ice reports effective with pain management. Reports progression in rehab and hopes to dec by end of week.       Allergies   Allergen Reactions   ? Penicillins Rash     Rash.     :     Current Outpatient Medications   Medication Sig   ? acetaminophen (TYLENOL) 500 MG tablet Take 1,000 mg by mouth 4 (four) times a day.   ? ascorbic acid, vitamin C, (VITAMIN C) 500 MG tablet Take 500 mg by mouth daily.   ? aspirin 81 MG EC tablet Take 81 mg by mouth 2 (two) times a day.   ? atorvastatin (LIPITOR) 10 MG tablet Take 10 mg by mouth every evening.   ? cholecalciferol, vitamin D3, 1,000 unit (25 mcg) tablet Take 2,000 Units by mouth daily.   ? donepezil (ARICEPT) 5 MG tablet Take 5 mg by mouth daily.   ? fluticasone propionate (FLONASE) 50 mcg/actuation nasal spray Apply 1 spray into each nostril daily as needed for rhinitis.   ? hydrOXYzine pamoate  (VISTARIL) 25 MG capsule Take 25 mg by mouth at bedtime.   ? ketotifen (ZADITOR/ZYRTEC ITCHY EYES) 0.025 % (0.035 %) ophthalmic solution Administer 1 drop to both eyes daily as needed.   ? melatonin 5 mg Tab tablet Take 5 mg by mouth at bedtime.   ? multivitamin (MULTIPLE VITAMIN ORAL) Take 1 tablet by mouth daily.   ? oxyCODONE (ROXICODONE) 5 MG immediate release tablet Take 5-10 mg by mouth every 4 (four) hours as needed for pain.   ? polyethylene glycol (MIRALAX) 17 gram packet Take 17 g by mouth 2 (two) times a day.   ? senna-docusate (SENNOSIDES-DOCUSATE SODIUM) 8.6-50 mg tablet Take 1 tablet by mouth daily.   ? tamsulosin (FLOMAX) 0.4 mg cap Take 0.4 mg by mouth daily after supper.         REVIEW OF SYSTEMS:    Currently, no fever, chills, or rigors. Does not have any visual or hearing problems. Denies any chest pain, headaches, palpitations, lightheadedness, dizziness, shortness of breath, or cough. Appetite is good. Denies any GERD symptoms. Denies any difficulty with swallowing, nausea, or vomiting.  Denies any abdominal pain,   Constipation imporved. Denies any urinary symptoms. No insomnia. No active bleeding. No rash.       PHYSICAL EXAMINATION:  Vitals:    11/14/19 0512   BP: 144/61   Pulse: 78   Temp: 97.4  F (36.3  C)   Weight: 204 lb 9.6 oz (92.8 kg)         GENERAL: Awake, Alert, oriented x3, not in any form of acute distress, answers questions appropriately, follows simple commands, conversant  HEENT: Head is normocephalic with normal hair distribution. No evidence of trauma. Ears: No acute purulent discharge. Eyes: Conjunctivae pink with no scleral jaundice. Nose: Normal mucosa and septum. NECK: Supple with no cervical or supraclavicular lymphadenopathy. Trachea is midline.   CHEST: No tenderness or deformity, no crepitus  LUNG: Clear to auscultation with good chest expansion. There are no crackles or wheezes, normal AP diameter.  BACK: No kyphosis of the thoracic spine. Symmetric, no curvature,  ROM normal, no CVA tenderness, no spinal tenderness   CVS: There is good S1  S2, rhythm is regular.  ABDOMEN: Globular and soft, nontender to palpation, non distended, no masses, no organomegaly, good bowel sounds, no rebound or guarding, no peritoneal signs.   EXTREMITIES: Atraumatic. Limited  range of motion to left knee,  extremities, there is no tenderness to palpation, no pedal edema  SKIN: Warm and dry, no erythema noted, no rashes or lesions.  NEUROLOGICAL: The patient is oriented to person, place and time. Strength and sensation are grossly intact. Face is symmetric.                    LABS:    Lab Results   Component Value Date    WBC 9.5 11/12/2019    HGB 12.0 (L) 11/12/2019    HCT 37.6 (L) 11/12/2019    MCV 92 11/12/2019     11/12/2019       Results for orders placed or performed in visit on 11/12/19   Basic Metabolic Panel   Result Value Ref Range    Sodium 138 136 - 145 mmol/L    Potassium 4.2 3.5 - 5.0 mmol/L    Chloride 104 98 - 107 mmol/L    CO2 23 22 - 31 mmol/L    Anion Gap, Calculation 11 5 - 18 mmol/L    Glucose 110 70 - 125 mg/dL    Calcium 9.4 8.5 - 10.5 mg/dL    BUN 17 8 - 28 mg/dL    Creatinine 1.00 0.70 - 1.30 mg/dL    GFR MDRD Af Amer >60 >60 mL/min/1.73m2    GFR MDRD Non Af Amer >60 >60 mL/min/1.73m2           No results found for: HGBA1C  No results found for: [id number]  No results found for: [id number]    ASSESSMENT/PLAN:  1. Primary osteoarthritis of left knee    2. Drug-induced constipation      1.LTKA: Leave current dressing in place, SN to assist with pain management and chronic medical conditions. To attend therapies for rehab for knee. Reports pain present, ice has been an effective adjunct to oral meds. Ongoing therapies with potential dc this week end.    2.Costipaton, Started on on Miralax daily, add senna s 1 po daily for bowel program , reports BM more regular now.        Electronically signed by:  Apolonia Rodriguez CNP  This progress note was completed using Dragon  software and there may be grammatical errors.

## 2021-06-19 NOTE — LETTER
Letter by Apolonia Rodriguez CNP at      Author: Apolonia Rodriguez CNP Service: -- Author Type: --    Filed:  Encounter Date: 2019 Status: Signed         Patient: Burt Lopez   MR Number: [id number]   YOB: 1945   Date of Visit: 2019       [facility]      NAME:  Burt Lopez             :  1945    MRN: [id number]    CODE STATUS:  FULL CODE    FACILITY: [facility] [[id number]]  CHIEF COMPLAIN/REASON FOR VISIT:  Chief Complaint   Patient presents with   ? Review Of Multiple Medical Conditions       HISTORY OF PRESENT ILLNESS: Burt Lopez is a 74 y.o. male being seen today as a new admit from [facility]. He has a with a history of left knee osteoarthritis, abdominal aortic aneurysm, BPH, asthma and primary progressive aphasia, who underwent ARTHROPLASTY LEFT KNEE on 2019  His knee is dressed, , dressing C/D/I, o remain on until first ortho vidit. Reports his pain is stable. Knee with faded bruising noted and swelling. He has been using ice reports effective with pain management. Relatively benign assessment other then no BM x5 days.      Allergies   Allergen Reactions   ? Penicillins Rash     Rash.     :     Current Outpatient Medications   Medication Sig   ? acetaminophen (TYLENOL) 500 MG tablet Take 1,000 mg by mouth 4 (four) times a day.   ? ascorbic acid, vitamin C, (VITAMIN C) 500 MG tablet Take 500 mg by mouth daily.   ? aspirin 81 MG EC tablet Take 81 mg by mouth 2 (two) times a day.   ? atorvastatin (LIPITOR) 10 MG tablet Take 10 mg by mouth every evening.   ? cholecalciferol, vitamin D3, 1,000 unit (25 mcg) tablet Take 2,000 Units by mouth daily.   ? donepezil (ARICEPT) 5 MG tablet Take 5 mg by mouth daily.   ? fluticasone propionate (FLONASE) 50 mcg/actuation nasal spray Apply 1 spray into each nostril daily as needed for rhinitis.   ? hydrOXYzine pamoate (VISTARIL) 25 MG capsule Take 25 mg by mouth at bedtime.    ? ketotifen (ZADITOR/ZYRTEC ITCHY EYES) 0.025 % (0.035 %) ophthalmic solution Administer 1 drop to both eyes daily as needed.   ? melatonin 5 mg Tab tablet Take 5 mg by mouth at bedtime.   ? multivitamin (MULTIPLE VITAMIN ORAL) Take 1 tablet by mouth daily.   ? oxyCODONE (ROXICODONE) 5 MG immediate release tablet Take 5-10 mg by mouth every 4 (four) hours as needed for pain.   ? polyethylene glycol (MIRALAX) 17 gram packet Take 17 g by mouth 2 (two) times a day.   ? senna-docusate (SENNOSIDES-DOCUSATE SODIUM) 8.6-50 mg tablet Take 1 tablet by mouth daily.   ? tamsulosin (FLOMAX) 0.4 mg cap Take 0.4 mg by mouth daily after supper.         REVIEW OF SYSTEMS:    Currently, no fever, chills, or rigors. Does not have any visual or hearing problems. Denies any chest pain, headaches, palpitations, lightheadedness, dizziness, shortness of breath, or cough. Appetite is good. Denies any GERD symptoms. Denies any difficulty with swallowing, nausea, or vomiting.  Denies any abdominal pain, has  constipation. Denies any urinary symptoms. No insomnia. No active bleeding. No rash.       PHYSICAL EXAMINATION:  Vitals:    11/12/19 0554   BP: 135/72   Pulse: 69   Temp: 97.2  F (36.2  C)   Weight: 204 lb 9.6 oz (92.8 kg)         GENERAL: Awake, Alert, oriented x3, not in any form of acute distress, answers questions appropriately, follows simple commands, conversant  HEENT: Head is normocephalic with normal hair distribution. No evidence of trauma. Ears: No acute purulent discharge. Eyes: Conjunctivae pink with no scleral jaundice. Nose: Normal mucosa and septum. NECK: Supple with no cervical or supraclavicular lymphadenopathy. Trachea is midline.   CHEST: No tenderness or deformity, no crepitus  LUNG: Clear to auscultation with good chest expansion. There are no crackles or wheezes, normal AP diameter.  BACK: No kyphosis of the thoracic spine. Symmetric, no curvature, ROM normal, no CVA tenderness, no spinal tenderness   CVS: There  is good S1  S2, rhythm is regular.  ABDOMEN: Globular and soft, nontender to palpation, non distended, no masses, no organomegaly, good bowel sounds, no rebound or guarding, no peritoneal signs.   EXTREMITIES: Atraumatic. Limited  range of motion to left knee,  extremities, there is no tenderness to palpation, no pedal edema  SKIN: Warm and dry, no erythema noted, no rashes or lesions.  NEUROLOGICAL: The patient is oriented to person, place and time. Strength and sensation are grossly intact. Face is symmetric.                    LABS:    No results found for: WBC, HGB, HCT, MCV, PLT    No results found for this or any previous visit.        No results found for: HGBA1C  No results found for: [id number]  No results found for: [id number]    ASSESSMENT/PLAN:  1. Primary osteoarthritis of left knee    2. Drug-induced constipation    3. ACP (advance care planning)      1.LTKA: Leave current dressing in place, SN to assist with pain management and chronic medical conditions. To attend therapies for rehab for knee.    2.Costipaton, no BM x 5 days, give MOM 30 cc po today, he is on Miralax daily, add senna s 1 po daily for bowel program     3. ACP: Pt full code status, greater then 16 minutes spent face to face with pt discussing his advance care planning.       Electronically signed by:  Apolonia Rodriguez CNP  This progress note was completed using Dragon software and there may be grammatical errors.

## 2021-06-19 NOTE — LETTER
Letter by Apolonia Rodriguez CNP at      Author: Apolonia Rodriguez CNP Service: -- Author Type: --    Filed:  Encounter Date: 2019 Status: Signed         Patient: uBrt Lopez   MR Number: [id number]   YOB: 1945   Date of Visit: 2019     [facility]      NAME:  Burt Lopez             :  1945  MRN: [id number]  CODE STATUS:  FULL CODE    VISIT TYPE: DISCHARGE SUMMARY  FACILYTY: [facility] [[id number]]       HOSPITALIZATION:         [place] ,   19     PRIMARY CARE PROVIDER: Adan Reyes MD    DISCHARGE DIAGNOSIS:      1. Primary osteoarthritis of left knee    2. Cerebral infarction due to anterior cerebral artery occlusion, unspecified blood vessel laterality (H)    3. Primary progressive aphasia (H)         DISCHARGE MEDICATIONS:         Medication List          Accurate as of 2019  5:45 PM. If you have any questions, ask your nurse or doctor.            CONTINUE taking these medications    acetaminophen 500 MG tablet  Commonly known as:  TYLENOL     ascorbic acid (vitamin C) 500 MG tablet  Commonly known as:  VITAMIN C     aspirin 81 MG EC tablet     atorvastatin 10 MG tablet  Commonly known as:  LIPITOR     cholecalciferol (vitamin D3) 1,000 unit (25 mcg) tablet     donepezil 5 MG tablet  Commonly known as:  ARICEPT     fluticasone propionate 50 mcg/actuation nasal spray  Commonly known as:  FLONASE     hydrOXYzine pamoate 25 MG capsule  Commonly known as:  VISTARIL     ketotifen 0.025 % (0.035 %) ophthalmic solution  Commonly known as:  ZADITOR/ZyrTEC ITCHY EYES     melatonin 5 mg Tab tablet     MULTIPLE VITAMIN ORAL     oxyCODONE 5 MG immediate release tablet  Commonly known as:  ROXICODONE     polyethylene glycol 17 gram packet  Commonly known as:  MIRALAX     sennosides-docusate sodium 8.6-50 mg tablet  Generic drug:  senna-docusate     tamsulosin 0.4 mg Cap  Commonly known as:  FLOMAX            HISTORY OF  PRESENT ILLNESS: Burt Lopez is a 74 y.o. male being seen for a face to face visit for an anticipated dc home in the am. He is going to require ongoing HHA services upon dc home. He has a with a history of left knee osteoarthritis, abdominal aortic aneurysm, BPH, asthma and primary progressive aphasia, who underwent ARTHROPLASTY LEFT KNEE on 11/5/2019  His knee is dressed, , dressing C/D/I, to remain on until first ortho visi, which is 11/15/19t. Reports his pain is stable. Knee with faded bruising noted and swelling. He has been using ice reports effective with pain management FWB status    SKILLED NURSING FACILITY COURSE:  During this TCU stay, patient completed all anticipated goals of therapy.      PHYSICAL EXAMINATION:    Vitals:    11/14/19 1730   BP: 168/67   Pulse: 87   Temp: 98.7  F (37.1  C)   Weight: 204 lb 9.6 oz (92.8 kg)         GENERAL: Awake, Alert, oriented x3, not in any form of acute distress, answers questions appropriately, follows simple commands, conversant  HEENT: Head is normocephalic with normal hair distribution. No evidence of trauma. Ears: No acute purulent discharge. Eyes: Conjunctivae pink with no scleral jaundice. Nose: Normal mucosa and septum. NECK: Supple with no cervical or supraclavicular lymphadenopathy. Trachea is midline.   CHEST: No tenderness or deformity, no crepitus  LUNG: Clear to auscultation with good chest expansion. There are no crackles or wheezes, normal AP diameter.  BACK: No kyphosis of the thoracic spine. Symmetric, no curvature, ROM normal, no CVA tenderness, no spinal tenderness   CVS: There is good S1  S2,  rhythm is regular.  ABDOMEN: Globular and soft, nontender to palpation, non distended, no masses, no organomegaly, good bowel sounds, no rebound or guarding, no peritoneal signs.   EXTREMITIES: Left knee with C/D/I dressing, has swelling and faded bruising, back of knee with two circular rebecca size open areas, they are dry and were blisters that  ruptured. I would suggest leaving OA as they are dry.  SKIN: Warm and dry, no erythema noted, no rashes or lesions.  NEUROLOGICAL: The patient is oriented to person, place and time. Strength and sensation are grossly intact. Face is symmetric.      LABS:  All labs reviewed in the nursing home record.        DISCHARGE PLAN: I certify that this patient is under Dr. Stacy's care, seen by the NP, and had a face-to-face encounter that meets the physician face-to-face encounter requirements on 11/14/19  The encounter was in whole, or part related to the primary reason for home health.  The Patient is homebound due to: LTKA, h/o CVA and  it is  taxing and it will take a considerable amount of effort for patient to leave the home.  He is dependent on others for transportation.  The patient is confined to his home and needs intermittent skilled nursing, PT,OT,   The patient has been under the care of Dr. Stacy/NP and Dr. Stacy  initiated the establishment of the plan of care.        Patient to be followed by home care for physical therapy to eval and treat for strengthening, balance, endurance, and safety with mobility, and ambulation.  Patient to be followed by home care for occupational therapy to eval and treat for strengthening, ADL needs, adaptive equipment, and safety.  Patient to be followed by home care for nursing services for medication set up and teaching, symptom and disease processes monitoring and education.    Patient to be followed by home care for home health aid services for bathing and ADL needs.      Patient will follow up with PCP within 7  days after discharge for medication mangagment and appropriate lab studies.      Post Discharge Medication Reconciliation Status: discharge medications reconciled and changed, per note/orders (see AVS)    Electronically signed by:  Apolonia Rodriguez CNP  This progress note was completed using Dragon software and there may be grammatical errors.      For documentation  purposes, chart review, medication management, and discharge coordination of care was greater than 35 minutes     Attestation signed by Yadira Satcy MBBS at 11/14/2019  7:18 PM:  Agree with discharge plan

## 2021-06-28 NOTE — PROGRESS NOTES
Progress Notes by Apolonia Rodriguez CNP at 2019  8:34 AM     Author: Apolonia Rodriguez CNP Service: -- Author Type: Nurse Practitioner    Filed: 2019  5:57 PM Encounter Date: 2019 Status: Attested    : Apolonia Rodriguez CNP (Nurse Practitioner) Cosigner: Yadira Stacy MBBS at 2019  7:18 PM    Attestation signed by Yadira Stacy MBBS at 2019  7:18 PM    Agree with discharge plan                [facility]      NAME:  Burt Lopez             :  1945  MRN: [id number]  CODE STATUS:  FULL CODE    VISIT TYPE: DISCHARGE SUMMARY  FACILYTY: [facility] [[id number]]       HOSPITALIZATION:         [place] ,   19     PRIMARY CARE PROVIDER: Adan Reyes MD    DISCHARGE DIAGNOSIS:      1. Primary osteoarthritis of left knee    2. Cerebral infarction due to anterior cerebral artery occlusion, unspecified blood vessel laterality (H)    3. Primary progressive aphasia (H)         DISCHARGE MEDICATIONS:         Medication List          Accurate as of 2019  5:45 PM. If you have any questions, ask your nurse or doctor.            CONTINUE taking these medications    acetaminophen 500 MG tablet  Commonly known as:  TYLENOL     ascorbic acid (vitamin C) 500 MG tablet  Commonly known as:  VITAMIN C     aspirin 81 MG EC tablet     atorvastatin 10 MG tablet  Commonly known as:  LIPITOR     cholecalciferol (vitamin D3) 1,000 unit (25 mcg) tablet     donepezil 5 MG tablet  Commonly known as:  ARICEPT     fluticasone propionate 50 mcg/actuation nasal spray  Commonly known as:  FLONASE     hydrOXYzine pamoate 25 MG capsule  Commonly known as:  VISTARIL     ketotifen 0.025 % (0.035 %) ophthalmic solution  Commonly known as:  ZADITOR/ZyrTEC ITCHY EYES     melatonin 5 mg Tab tablet     MULTIPLE VITAMIN ORAL     oxyCODONE 5 MG immediate release tablet  Commonly known as:  ROXICODONE     polyethylene glycol 17 gram packet  Commonly known as:   MIRALAX     sennosides-docusate sodium 8.6-50 mg tablet  Generic drug:  senna-docusate     tamsulosin 0.4 mg Cap  Commonly known as:  FLOMAX            HISTORY OF PRESENT ILLNESS: Burt Lopez is a 74 y.o. male being seen for a face to face visit for an anticipated dc home in the am. He is going to require ongoing HHA services upon dc home. He has a with a history of left knee osteoarthritis, abdominal aortic aneurysm, BPH, asthma and primary progressive aphasia, who underwent ARTHROPLASTY LEFT KNEE on 11/5/2019  His knee is dressed, , dressing C/D/I, to remain on until first ortho visi, which is 11/15/19t. Reports his pain is stable. Knee with faded bruising noted and swelling. He has been using ice reports effective with pain management FWB status    SKILLED NURSING FACILITY COURSE:  During this TCU stay, patient completed all anticipated goals of therapy.      PHYSICAL EXAMINATION:    Vitals:    11/14/19 1730   BP: 168/67   Pulse: 87   Temp: 98.7  F (37.1  C)   Weight: 204 lb 9.6 oz (92.8 kg)         GENERAL: Awake, Alert, oriented x3, not in any form of acute distress, answers questions appropriately, follows simple commands, conversant  HEENT: Head is normocephalic with normal hair distribution. No evidence of trauma. Ears: No acute purulent discharge. Eyes: Conjunctivae pink with no scleral jaundice. Nose: Normal mucosa and septum. NECK: Supple with no cervical or supraclavicular lymphadenopathy. Trachea is midline.   CHEST: No tenderness or deformity, no crepitus  LUNG: Clear to auscultation with good chest expansion. There are no crackles or wheezes, normal AP diameter.  BACK: No kyphosis of the thoracic spine. Symmetric, no curvature, ROM normal, no CVA tenderness, no spinal tenderness   CVS: There is good S1  S2,  rhythm is regular.  ABDOMEN: Globular and soft, nontender to palpation, non distended, no masses, no organomegaly, good bowel sounds, no rebound or guarding, no peritoneal signs.    EXTREMITIES: Left knee with C/D/I dressing, has swelling and faded bruising, back of knee with two circular rebecca size open areas, they are dry and were blisters that ruptured. I would suggest leaving OA as they are dry.  SKIN: Warm and dry, no erythema noted, no rashes or lesions.  NEUROLOGICAL: The patient is oriented to person, place and time. Strength and sensation are grossly intact. Face is symmetric.      LABS:  All labs reviewed in the nursing home record.        DISCHARGE PLAN: I certify that this patient is under Dr. Stacy's care, seen by the NP, and had a face-to-face encounter that meets the physician face-to-face encounter requirements on 11/14/19  The encounter was in whole, or part related to the primary reason for home health.  The Patient is homebound due to: LTKA, h/o CVA and  it is  taxing and it will take a considerable amount of effort for patient to leave the home.  He is dependent on others for transportation.  The patient is confined to his home and needs intermittent skilled nursing, PT,OT,   The patient has been under the care of Dr. Stacy/NP and Dr. Stacy  initiated the establishment of the plan of care.        Patient to be followed by home care for physical therapy to eval and treat for strengthening, balance, endurance, and safety with mobility, and ambulation.  Patient to be followed by home care for occupational therapy to eval and treat for strengthening, ADL needs, adaptive equipment, and safety.  Patient to be followed by home care for nursing services for medication set up and teaching, symptom and disease processes monitoring and education.    Patient to be followed by home care for home health aid services for bathing and ADL needs.      Patient will follow up with PCP within 7  days after discharge for medication mangagment and appropriate lab studies.      Post Discharge Medication Reconciliation Status: discharge medications reconciled and changed, per note/orders (see  AVS)    Electronically signed by:  Apolonia Rodriguez CNP  This progress note was completed using Dragon software and there may be grammatical errors.      For documentation purposes, chart review, medication management, and discharge coordination of care was greater than 35 minutes

## 2023-12-10 ENCOUNTER — HOSPITAL ENCOUNTER (EMERGENCY)
Facility: CLINIC | Age: 78
Discharge: HOME | End: 2023-12-10
Attending: EMERGENCY MEDICINE | Admitting: EMERGENCY MEDICINE
Payer: COMMERCIAL

## 2023-12-10 VITALS
HEART RATE: 75 BPM | TEMPERATURE: 97.8 F | RESPIRATION RATE: 16 BRPM | DIASTOLIC BLOOD PRESSURE: 84 MMHG | SYSTOLIC BLOOD PRESSURE: 153 MMHG | OXYGEN SATURATION: 98 %

## 2023-12-10 PROCEDURE — 99281 EMR DPT VST MAYX REQ PHY/QHP: CPT | Performed by: FAMILY MEDICINE

## 2023-12-10 ASSESSMENT — ACTIVITIES OF DAILY LIVING (ADL)
ADLS_ACUITY_SCORE: 33
ADLS_ACUITY_SCORE: 33

## 2024-03-18 ENCOUNTER — DOCUMENTATION ONLY (OUTPATIENT)
Dept: OTHER | Facility: CLINIC | Age: 79
End: 2024-03-18
Payer: COMMERCIAL

## 2024-03-29 ENCOUNTER — LAB REQUISITION (OUTPATIENT)
Dept: LAB | Facility: CLINIC | Age: 79
End: 2024-03-29
Payer: COMMERCIAL

## 2024-03-29 DIAGNOSIS — N39.0 URINARY TRACT INFECTION, SITE NOT SPECIFIED: ICD-10-CM

## 2024-03-29 LAB
ALBUMIN UR-MCNC: NEGATIVE MG/DL
APPEARANCE UR: CLEAR
BILIRUB UR QL STRIP: NEGATIVE
CAOX CRY #/AREA URNS HPF: ABNORMAL /HPF
COLOR UR AUTO: YELLOW
GLUCOSE UR STRIP-MCNC: NEGATIVE MG/DL
HGB UR QL STRIP: NEGATIVE
KETONES UR STRIP-MCNC: NEGATIVE MG/DL
LEUKOCYTE ESTERASE UR QL STRIP: NEGATIVE
MUCOUS THREADS #/AREA URNS LPF: PRESENT /LPF
NITRATE UR QL: NEGATIVE
PH UR STRIP: 5.5 [PH] (ref 5–7)
RBC URINE: 0 /HPF
SP GR UR STRIP: 1.02 (ref 1–1.03)
UROBILINOGEN UR STRIP-MCNC: NORMAL MG/DL
WBC URINE: 0 /HPF

## 2024-03-29 PROCEDURE — 87186 SC STD MICRODIL/AGAR DIL: CPT | Mod: ORL | Performed by: PHYSICIAN ASSISTANT

## 2024-03-29 PROCEDURE — 87086 URINE CULTURE/COLONY COUNT: CPT | Mod: ORL | Performed by: PHYSICIAN ASSISTANT

## 2024-03-29 PROCEDURE — 81001 URINALYSIS AUTO W/SCOPE: CPT | Mod: ORL | Performed by: PHYSICIAN ASSISTANT

## 2024-04-01 LAB
BACTERIA UR CULT: ABNORMAL
BACTERIA UR CULT: ABNORMAL

## 2024-04-02 ENCOUNTER — LAB REQUISITION (OUTPATIENT)
Dept: LAB | Facility: CLINIC | Age: 79
End: 2024-04-02
Payer: COMMERCIAL

## 2024-04-02 DIAGNOSIS — E78.5 HYPERLIPIDEMIA, UNSPECIFIED: ICD-10-CM

## 2024-04-02 DIAGNOSIS — E53.8 DEFICIENCY OF OTHER SPECIFIED B GROUP VITAMINS: ICD-10-CM

## 2024-04-02 DIAGNOSIS — E07.9 DISORDER OF THYROID, UNSPECIFIED: ICD-10-CM

## 2024-04-02 DIAGNOSIS — I10 ESSENTIAL (PRIMARY) HYPERTENSION: ICD-10-CM

## 2024-04-02 DIAGNOSIS — E11.9 TYPE 2 DIABETES MELLITUS WITHOUT COMPLICATIONS (H): ICD-10-CM

## 2024-04-02 DIAGNOSIS — E55.9 VITAMIN D DEFICIENCY, UNSPECIFIED: ICD-10-CM

## 2024-04-03 LAB
BASOPHILS # BLD AUTO: 0.1 10E3/UL (ref 0–0.2)
BASOPHILS NFR BLD AUTO: 1 %
EOSINOPHIL # BLD AUTO: 0.4 10E3/UL (ref 0–0.7)
EOSINOPHIL NFR BLD AUTO: 6 %
ERYTHROCYTE [DISTWIDTH] IN BLOOD BY AUTOMATED COUNT: 12.3 % (ref 10–15)
HBA1C MFR BLD: 5.5 %
HCT VFR BLD AUTO: 40.6 % (ref 40–53)
HGB BLD-MCNC: 14.1 G/DL (ref 13.3–17.7)
IMM GRANULOCYTES # BLD: 0 10E3/UL
IMM GRANULOCYTES NFR BLD: 0 %
LYMPHOCYTES # BLD AUTO: 2 10E3/UL (ref 0.8–5.3)
LYMPHOCYTES NFR BLD AUTO: 29 %
MCH RBC QN AUTO: 31.3 PG (ref 26.5–33)
MCHC RBC AUTO-ENTMCNC: 34.7 G/DL (ref 31.5–36.5)
MCV RBC AUTO: 90 FL (ref 78–100)
MONOCYTES # BLD AUTO: 0.6 10E3/UL (ref 0–1.3)
MONOCYTES NFR BLD AUTO: 9 %
NEUTROPHILS # BLD AUTO: 3.9 10E3/UL (ref 1.6–8.3)
NEUTROPHILS NFR BLD AUTO: 55 %
NRBC # BLD AUTO: 0 10E3/UL
NRBC BLD AUTO-RTO: 0 /100
PLATELET # BLD AUTO: 216 10E3/UL (ref 150–450)
RBC # BLD AUTO: 4.51 10E6/UL (ref 4.4–5.9)
WBC # BLD AUTO: 6.9 10E3/UL (ref 4–11)

## 2024-04-03 PROCEDURE — 83036 HEMOGLOBIN GLYCOSYLATED A1C: CPT | Mod: ORL | Performed by: FAMILY MEDICINE

## 2024-04-03 PROCEDURE — 82746 ASSAY OF FOLIC ACID SERUM: CPT | Mod: ORL | Performed by: FAMILY MEDICINE

## 2024-04-03 PROCEDURE — 80061 LIPID PANEL: CPT | Mod: ORL | Performed by: FAMILY MEDICINE

## 2024-04-03 PROCEDURE — 36415 COLL VENOUS BLD VENIPUNCTURE: CPT | Mod: ORL | Performed by: FAMILY MEDICINE

## 2024-04-03 PROCEDURE — 82607 VITAMIN B-12: CPT | Mod: ORL | Performed by: FAMILY MEDICINE

## 2024-04-03 PROCEDURE — 80053 COMPREHEN METABOLIC PANEL: CPT | Mod: ORL | Performed by: FAMILY MEDICINE

## 2024-04-03 PROCEDURE — 82306 VITAMIN D 25 HYDROXY: CPT | Mod: ORL | Performed by: FAMILY MEDICINE

## 2024-04-03 PROCEDURE — 84443 ASSAY THYROID STIM HORMONE: CPT | Mod: ORL | Performed by: FAMILY MEDICINE

## 2024-04-03 PROCEDURE — P9603 ONE-WAY ALLOW PRORATED MILES: HCPCS | Mod: ORL | Performed by: FAMILY MEDICINE

## 2024-04-03 PROCEDURE — 85025 COMPLETE CBC W/AUTO DIFF WBC: CPT | Mod: ORL | Performed by: FAMILY MEDICINE

## 2024-04-04 LAB
ALBUMIN SERPL BCG-MCNC: 4.2 G/DL (ref 3.5–5.2)
ALP SERPL-CCNC: 82 U/L (ref 40–150)
ALT SERPL W P-5'-P-CCNC: 23 U/L (ref 0–70)
ANION GAP SERPL CALCULATED.3IONS-SCNC: 12 MMOL/L (ref 7–15)
AST SERPL W P-5'-P-CCNC: 26 U/L (ref 0–45)
BILIRUB SERPL-MCNC: 0.6 MG/DL
BUN SERPL-MCNC: 11.8 MG/DL (ref 8–23)
CALCIUM SERPL-MCNC: 9.1 MG/DL (ref 8.8–10.2)
CHLORIDE SERPL-SCNC: 104 MMOL/L (ref 98–107)
CHOLEST SERPL-MCNC: 114 MG/DL
CREAT SERPL-MCNC: 0.9 MG/DL (ref 0.67–1.17)
DEPRECATED HCO3 PLAS-SCNC: 25 MMOL/L (ref 22–29)
EGFRCR SERPLBLD CKD-EPI 2021: 87 ML/MIN/1.73M2
FASTING STATUS PATIENT QL REPORTED: ABNORMAL
FOLATE SERPL-MCNC: 19.8 NG/ML (ref 4.6–34.8)
GLUCOSE SERPL-MCNC: 102 MG/DL (ref 70–99)
HDLC SERPL-MCNC: 36 MG/DL
LDLC SERPL CALC-MCNC: 43 MG/DL
NONHDLC SERPL-MCNC: 78 MG/DL
POTASSIUM SERPL-SCNC: 4.2 MMOL/L (ref 3.4–5.3)
PROT SERPL-MCNC: 7 G/DL (ref 6.4–8.3)
SODIUM SERPL-SCNC: 141 MMOL/L (ref 135–145)
TRIGL SERPL-MCNC: 173 MG/DL
TSH SERPL DL<=0.005 MIU/L-ACNC: 0.95 UIU/ML (ref 0.3–4.2)
VIT B12 SERPL-MCNC: 737 PG/ML (ref 232–1245)
VIT D+METAB SERPL-MCNC: 27 NG/ML (ref 20–50)

## 2024-05-01 ENCOUNTER — LAB REQUISITION (OUTPATIENT)
Dept: LAB | Facility: CLINIC | Age: 79
End: 2024-05-01
Payer: COMMERCIAL

## 2024-05-01 DIAGNOSIS — N39.0 URINARY TRACT INFECTION, SITE NOT SPECIFIED: ICD-10-CM

## 2024-05-01 LAB
ALBUMIN UR-MCNC: NEGATIVE MG/DL
APPEARANCE UR: CLEAR
BILIRUB UR QL STRIP: NEGATIVE
COLOR UR AUTO: ABNORMAL
GLUCOSE UR STRIP-MCNC: NEGATIVE MG/DL
HGB UR QL STRIP: NEGATIVE
KETONES UR STRIP-MCNC: NEGATIVE MG/DL
LEUKOCYTE ESTERASE UR QL STRIP: NEGATIVE
MUCOUS THREADS #/AREA URNS LPF: PRESENT /LPF
NITRATE UR QL: NEGATIVE
PH UR STRIP: 6 [PH] (ref 5–7)
RBC URINE: 1 /HPF
SP GR UR STRIP: 1.01 (ref 1–1.03)
SQUAMOUS EPITHELIAL: <1 /HPF
UROBILINOGEN UR STRIP-MCNC: NORMAL MG/DL
WBC URINE: 1 /HPF

## 2024-05-01 PROCEDURE — 87086 URINE CULTURE/COLONY COUNT: CPT | Mod: ORL | Performed by: FAMILY MEDICINE

## 2024-05-01 PROCEDURE — 81001 URINALYSIS AUTO W/SCOPE: CPT | Mod: ORL | Performed by: FAMILY MEDICINE

## 2024-05-03 LAB — BACTERIA UR CULT: NORMAL

## 2024-05-25 ENCOUNTER — HEALTH MAINTENANCE LETTER (OUTPATIENT)
Age: 79
End: 2024-05-25

## 2024-08-03 ENCOUNTER — HEALTH MAINTENANCE LETTER (OUTPATIENT)
Age: 79
End: 2024-08-03

## 2024-08-08 ENCOUNTER — LAB REQUISITION (OUTPATIENT)
Dept: LAB | Facility: CLINIC | Age: 79
End: 2024-08-08
Payer: COMMERCIAL

## 2024-08-08 DIAGNOSIS — N39.0 URINARY TRACT INFECTION, SITE NOT SPECIFIED: ICD-10-CM

## 2024-08-08 LAB
ALBUMIN UR-MCNC: NEGATIVE MG/DL
APPEARANCE UR: CLEAR
BILIRUB UR QL STRIP: NEGATIVE
CAOX CRY #/AREA URNS HPF: ABNORMAL /HPF
COLOR UR AUTO: YELLOW
GLUCOSE UR STRIP-MCNC: NEGATIVE MG/DL
HGB UR QL STRIP: NEGATIVE
HYALINE CASTS: 1 /LPF
KETONES UR STRIP-MCNC: NEGATIVE MG/DL
LEUKOCYTE ESTERASE UR QL STRIP: NEGATIVE
MUCOUS THREADS #/AREA URNS LPF: PRESENT /LPF
NITRATE UR QL: NEGATIVE
PH UR STRIP: 7 [PH] (ref 5–7)
RBC URINE: 2 /HPF
SP GR UR STRIP: 1.02 (ref 1–1.03)
UROBILINOGEN UR STRIP-MCNC: NORMAL MG/DL
WBC URINE: 2 /HPF

## 2024-08-08 PROCEDURE — 81001 URINALYSIS AUTO W/SCOPE: CPT | Mod: ORL | Performed by: FAMILY MEDICINE

## 2024-08-08 PROCEDURE — 87086 URINE CULTURE/COLONY COUNT: CPT | Mod: ORL | Performed by: FAMILY MEDICINE

## 2024-08-09 LAB — BACTERIA UR CULT: NORMAL

## 2024-08-27 ENCOUNTER — LAB REQUISITION (OUTPATIENT)
Dept: LAB | Facility: CLINIC | Age: 79
End: 2024-08-27
Payer: COMMERCIAL

## 2024-08-27 DIAGNOSIS — R30.0 DYSURIA: ICD-10-CM

## 2024-08-27 LAB
ALBUMIN UR-MCNC: NEGATIVE MG/DL
APPEARANCE UR: CLEAR
BACTERIA #/AREA URNS HPF: ABNORMAL /HPF
BILIRUB UR QL STRIP: NEGATIVE
COLOR UR AUTO: ABNORMAL
GLUCOSE UR STRIP-MCNC: NEGATIVE MG/DL
HGB UR QL STRIP: NEGATIVE
KETONES UR STRIP-MCNC: NEGATIVE MG/DL
LEUKOCYTE ESTERASE UR QL STRIP: NEGATIVE
MUCOUS THREADS #/AREA URNS LPF: PRESENT /LPF
NITRATE UR QL: NEGATIVE
PH UR STRIP: 6 [PH] (ref 5–7)
RBC URINE: 1 /HPF
SP GR UR STRIP: 1.01 (ref 1–1.03)
UROBILINOGEN UR STRIP-MCNC: NORMAL MG/DL
WBC URINE: 4 /HPF

## 2024-08-27 PROCEDURE — 81001 URINALYSIS AUTO W/SCOPE: CPT | Mod: ORL | Performed by: FAMILY MEDICINE

## 2024-08-27 PROCEDURE — 87086 URINE CULTURE/COLONY COUNT: CPT | Mod: ORL | Performed by: FAMILY MEDICINE

## 2024-08-28 LAB — BACTERIA UR CULT: NORMAL

## 2024-10-15 ENCOUNTER — LAB REQUISITION (OUTPATIENT)
Dept: LAB | Facility: CLINIC | Age: 79
End: 2024-10-15
Payer: COMMERCIAL

## 2024-10-15 DIAGNOSIS — F03.918 UNSPECIFIED DEMENTIA, UNSPECIFIED SEVERITY, WITH OTHER BEHAVIORAL DISTURBANCE (H): ICD-10-CM

## 2024-10-16 LAB
ERYTHROCYTE [DISTWIDTH] IN BLOOD BY AUTOMATED COUNT: 12.6 % (ref 10–15)
GLUCOSE SERPL-MCNC: 111 MG/DL (ref 70–99)
HCT VFR BLD AUTO: 42.9 % (ref 40–53)
HGB BLD-MCNC: 14.5 G/DL (ref 13.3–17.7)
MCH RBC QN AUTO: 30 PG (ref 26.5–33)
MCHC RBC AUTO-ENTMCNC: 33.8 G/DL (ref 31.5–36.5)
MCV RBC AUTO: 89 FL (ref 78–100)
PLATELET # BLD AUTO: 211 10E3/UL (ref 150–450)
RBC # BLD AUTO: 4.84 10E6/UL (ref 4.4–5.9)
WBC # BLD AUTO: 7.2 10E3/UL (ref 4–11)

## 2024-10-16 PROCEDURE — 36415 COLL VENOUS BLD VENIPUNCTURE: CPT | Mod: ORL | Performed by: FAMILY MEDICINE

## 2024-10-16 PROCEDURE — 80048 BASIC METABOLIC PNL TOTAL CA: CPT | Mod: ORL | Performed by: FAMILY MEDICINE

## 2024-10-16 PROCEDURE — 85027 COMPLETE CBC AUTOMATED: CPT | Mod: ORL | Performed by: FAMILY MEDICINE

## 2024-10-16 PROCEDURE — P9603 ONE-WAY ALLOW PRORATED MILES: HCPCS | Mod: ORL | Performed by: FAMILY MEDICINE

## 2024-10-17 LAB
ANION GAP SERPL CALCULATED.3IONS-SCNC: 13 MMOL/L (ref 7–15)
BUN SERPL-MCNC: 8.9 MG/DL (ref 8–23)
CALCIUM SERPL-MCNC: 9.3 MG/DL (ref 8.8–10.4)
CHLORIDE SERPL-SCNC: 106 MMOL/L (ref 98–107)
CREAT SERPL-MCNC: 0.91 MG/DL (ref 0.67–1.17)
EGFRCR SERPLBLD CKD-EPI 2021: 86 ML/MIN/1.73M2
HCO3 SERPL-SCNC: 22 MMOL/L (ref 22–29)
POTASSIUM SERPL-SCNC: 4.2 MMOL/L (ref 3.4–5.3)
SODIUM SERPL-SCNC: 141 MMOL/L (ref 135–145)

## 2024-12-21 ENCOUNTER — HEALTH MAINTENANCE LETTER (OUTPATIENT)
Age: 79
End: 2024-12-21